# Patient Record
Sex: FEMALE | Race: WHITE | Employment: UNEMPLOYED | URBAN - METROPOLITAN AREA
[De-identification: names, ages, dates, MRNs, and addresses within clinical notes are randomized per-mention and may not be internally consistent; named-entity substitution may affect disease eponyms.]

---

## 2017-04-27 ENCOUNTER — TELEPHONE (OUTPATIENT)
Dept: INTERNAL MEDICINE CLINIC | Facility: CLINIC | Age: 62
End: 2017-04-27

## 2017-04-27 NOTE — TELEPHONE ENCOUNTER
Spoke to pt. Advised her of below recommendation for her . Pt states she has this number. Also informed pt that she has not been seen since 2015 and recommended she call back to schedule a physical.  Pt voiced understanding.

## 2017-04-27 NOTE — TELEPHONE ENCOUNTER
Pt  needs hernia surgery, would like a few recommendations out of Sasha Brice, didn't like the doctor that her 's pcp recommended. Advised that Dr usually does not make recommendations for non-pts, wanted to ask anyway.  Thank you

## 2017-08-02 ENCOUNTER — TELEPHONE (OUTPATIENT)
Dept: INTERNAL MEDICINE CLINIC | Facility: CLINIC | Age: 62
End: 2017-08-02

## 2017-08-02 NOTE — TELEPHONE ENCOUNTER
Incoming (mail or fax): Fax  Received from:  Dr. Jovani Tran  Documentation given to:  [facility] medical records bin.

## 2017-08-03 NOTE — TELEPHONE ENCOUNTER
Medical records request rec'd from pt for Dr Anselmo Alejo for entire chart. Chart request to Recall 8-3-17.

## 2017-08-11 PROCEDURE — 87624 HPV HI-RISK TYP POOLED RSLT: CPT | Performed by: INTERNAL MEDICINE

## 2017-08-11 PROCEDURE — 88175 CYTOPATH C/V AUTO FLUID REDO: CPT | Performed by: INTERNAL MEDICINE

## 2017-11-28 ENCOUNTER — HOSPITAL ENCOUNTER (OUTPATIENT)
Dept: MAMMOGRAPHY | Age: 62
Discharge: HOME | End: 2017-11-28
Attending: INTERNAL MEDICINE
Payer: COMMERCIAL

## 2017-11-28 DIAGNOSIS — Z12.31 ENCOUNTER FOR SCREENING MAMMOGRAM FOR MALIGNANT NEOPLASM OF BREAST: ICD-10-CM

## 2017-11-28 PROCEDURE — 77067 SCR MAMMO BI INCL CAD: CPT | Performed by: INTERNAL MEDICINE

## 2017-11-28 PROCEDURE — 77063 BREAST TOMOSYNTHESIS BI: CPT | Performed by: INTERNAL MEDICINE

## 2017-12-04 ENCOUNTER — HOSPITAL ENCOUNTER (OUTPATIENT)
Age: 62
Discharge: HOME | End: 2017-12-04

## 2017-12-04 PROCEDURE — 90471 IMMUNIZATION ADMIN: CPT

## 2017-12-04 PROCEDURE — 90686 IIV4 VACC NO PRSV 0.5 ML IM: CPT

## 2018-10-23 ENCOUNTER — LAB ENCOUNTER (OUTPATIENT)
Dept: LAB | Age: 63
End: 2018-10-23
Attending: INTERNAL MEDICINE
Payer: COMMERCIAL

## 2018-10-23 ENCOUNTER — OFFICE VISIT (OUTPATIENT)
Dept: INTERNAL MEDICINE CLINIC | Facility: CLINIC | Age: 63
End: 2018-10-23
Payer: COMMERCIAL

## 2018-10-23 VITALS
OXYGEN SATURATION: 96 % | WEIGHT: 158.5 LBS | RESPIRATION RATE: 14 BRPM | SYSTOLIC BLOOD PRESSURE: 120 MMHG | HEART RATE: 66 BPM | BODY MASS INDEX: 26.41 KG/M2 | DIASTOLIC BLOOD PRESSURE: 74 MMHG | HEIGHT: 65 IN | TEMPERATURE: 98 F

## 2018-10-23 DIAGNOSIS — E04.1 THYROID NODULE: ICD-10-CM

## 2018-10-23 DIAGNOSIS — Z00.00 LABORATORY EXAM ORDERED AS PART OF ROUTINE GENERAL MEDICAL EXAMINATION: ICD-10-CM

## 2018-10-23 DIAGNOSIS — R19.00 PALPABLE ABDOMINAL MASS: ICD-10-CM

## 2018-10-23 DIAGNOSIS — Z12.39 SCREENING FOR BREAST CANCER: ICD-10-CM

## 2018-10-23 DIAGNOSIS — Z00.00 WELLNESS EXAMINATION: Primary | ICD-10-CM

## 2018-10-23 PROCEDURE — 80050 GENERAL HEALTH PANEL: CPT | Performed by: INTERNAL MEDICINE

## 2018-10-23 PROCEDURE — 84439 ASSAY OF FREE THYROXINE: CPT | Performed by: INTERNAL MEDICINE

## 2018-10-23 PROCEDURE — 80061 LIPID PANEL: CPT | Performed by: INTERNAL MEDICINE

## 2018-10-23 PROCEDURE — 36415 COLL VENOUS BLD VENIPUNCTURE: CPT | Performed by: INTERNAL MEDICINE

## 2018-10-23 PROCEDURE — 99396 PREV VISIT EST AGE 40-64: CPT | Performed by: INTERNAL MEDICINE

## 2018-10-23 RX ORDER — PHYTONADIONE (VIT K1) 100 MCG
TABLET ORAL DAILY
COMMUNITY
End: 2019-01-23

## 2018-10-23 NOTE — PROGRESS NOTES
[address] [facility] Internal Medicine Office Note  Chief Complaint:   Patient presents with:  Establish Care: lump on lower abdomen       HPI:   This is a 63year old female coming in for establishing care and physical  HPI   \"lump in abdomen\" for past HIVES    Current Outpatient Medications:  Vitamin K, Phytonadione, 100 MCG Oral Tab Take by mouth daily. Disp:  Rfl:    NON FORMULARY Mushroom Disp:  Rfl:    CALCIUM OR Take by mouth.  Disp:  Rfl:    Omega-3 Fatty Acids (FISH OIL OR) Take by m 3x5cm mass palpated a few cm below umbilicus at midline and mobile. nontender. no overlying erythema). There is no tenderness. Lymphadenopathy:     She has no cervical adenopathy. Neurological: She is alert. Skin: No rash noted.    Psychiatric: She ha Physical due on 08/11/2018  Influenza Vaccine(1) due on 09/01/2018  Patient/Caregiver Education: Patient/Caregiver Education: There are no barriers to learning. Medical education done. Outcome: Patient verbalizes understanding.  Patient is notified to call

## 2018-10-24 ENCOUNTER — HOSPITAL ENCOUNTER (OUTPATIENT)
Dept: ULTRASOUND IMAGING | Age: 63
Discharge: HOME | End: 2018-10-24
Attending: INTERNAL MEDICINE
Payer: COMMERCIAL

## 2018-10-24 DIAGNOSIS — R19.00 PALPABLE ABDOMINAL MASS: ICD-10-CM

## 2018-10-24 PROCEDURE — 76705 ECHO EXAM OF ABDOMEN: CPT | Performed by: INTERNAL MEDICINE

## 2018-10-25 ENCOUNTER — TELEPHONE (OUTPATIENT)
Dept: INTERNAL MEDICINE CLINIC | Facility: CLINIC | Age: 63
End: 2018-10-25

## 2018-10-25 ENCOUNTER — APPOINTMENT (OUTPATIENT)
Dept: LAB | Facility: HOSPITAL | Age: 63
End: 2018-10-25
Attending: INTERNAL MEDICINE
Payer: COMMERCIAL

## 2018-10-25 ENCOUNTER — HOSPITAL ENCOUNTER (OUTPATIENT)
Age: 63
End: 2018-10-25
Payer: COMMERCIAL

## 2018-10-25 DIAGNOSIS — N83.8 OVARIAN MASS: Primary | ICD-10-CM

## 2018-10-25 DIAGNOSIS — R19.00 PELVIC MASS: ICD-10-CM

## 2018-10-25 PROCEDURE — 86304 IMMUNOASSAY TUMOR CA 125: CPT

## 2018-10-25 PROCEDURE — 36415 COLL VENOUS BLD VENIPUNCTURE: CPT

## 2018-10-25 NOTE — ED NOTES
Patient left without having Flu shot administered. Answered yes to one of screening questions. Patient will follow up with PCP.

## 2018-10-25 NOTE — TELEPHONE ENCOUNTER
Informed pt that I spoke to [facility] @ the [facility] and an appt was scheduled for 10/29/2018 9:45 am with Dr Oscar Price. Pt should go to the [facility] on the 12th floor and [facility].  Advised pt to bring in insurance card as sh

## 2018-10-25 NOTE — TELEPHONE ENCOUNTER
Discussed results of ultrasound with patient. Large pelvic mass. I made her an appt with Dr. Pro Vogt on 10/29/18 at 10am and gave her the contact information.  She is also interested in following up at [facility] and we initiated this process and scheduled

## 2018-11-30 ENCOUNTER — TELEPHONE (OUTPATIENT)
Dept: INTERNAL MEDICINE CLINIC | Facility: CLINIC | Age: 63
End: 2018-11-30

## 2018-11-30 DIAGNOSIS — M65.311 TRIGGER FINGER OF RIGHT THUMB: Primary | ICD-10-CM

## 2018-11-30 DIAGNOSIS — M65.319 TRIGGER FINGER OF THUMB, UNSPECIFIED LATERALITY: ICD-10-CM

## 2018-11-30 NOTE — TELEPHONE ENCOUNTER
Call placed to inform patient. Dr. Ryan Alexandre Dr, [facility] [address] [facility], [address] [place] [address]  Phone: [phone]  Fax: [phone]    Copy of placard form made and placed in teal accordion file in [address] pod.     Copy placed into scan as well

## 2018-11-30 NOTE — TELEPHONE ENCOUNTER
Pt stated that she has a trigger thumb. She would like to see a hand specialist if possible and get a cortisone injection. Pt stated that on Dec 18th she is having a major surgery at [facility].  Advised pt she would need to check with surgeon to see if it is ok

## 2018-11-30 NOTE — TELEPHONE ENCOUNTER
Referral for Dr. Robin Ramos entered -> call patient with contact information.      Parking placard form filled out and in my outbox - inform patient she will need to fill out the top portion and add her drivers license number, etc. Form is in my outbox

## 2018-12-09 ENCOUNTER — HOSPITAL ENCOUNTER (OUTPATIENT)
Age: 63
Discharge: HOME | End: 2018-12-09
Attending: FAMILY MEDICINE
Payer: COMMERCIAL

## 2018-12-09 VITALS
HEART RATE: 85 BPM | OXYGEN SATURATION: 98 % | DIASTOLIC BLOOD PRESSURE: 71 MMHG | TEMPERATURE: 99 F | BODY MASS INDEX: 24 KG/M2 | SYSTOLIC BLOOD PRESSURE: 130 MMHG | RESPIRATION RATE: 16 BRPM | WEIGHT: 142 LBS

## 2018-12-09 DIAGNOSIS — N30.00 ACUTE CYSTITIS WITHOUT HEMATURIA: Primary | ICD-10-CM

## 2018-12-09 PROCEDURE — 87088 URINE BACTERIA CULTURE: CPT | Performed by: FAMILY MEDICINE

## 2018-12-09 PROCEDURE — 87186 SC STD MICRODIL/AGAR DIL: CPT | Performed by: FAMILY MEDICINE

## 2018-12-09 PROCEDURE — 87086 URINE CULTURE/COLONY COUNT: CPT | Performed by: FAMILY MEDICINE

## 2018-12-09 PROCEDURE — 99214 OFFICE O/P EST MOD 30 MIN: CPT

## 2018-12-09 PROCEDURE — 81002 URINALYSIS NONAUTO W/O SCOPE: CPT | Performed by: FAMILY MEDICINE

## 2018-12-09 RX ORDER — NITROFURANTOIN 25; 75 MG/1; MG/1
100 CAPSULE ORAL 2 TIMES DAILY
Qty: 14 CAPSULE | Refills: 0 | Status: SHIPPED | OUTPATIENT
Start: 2018-12-09 | End: 2018-12-16

## 2018-12-09 RX ORDER — PHENAZOPYRIDINE HYDROCHLORIDE 200 MG/1
200 TABLET, FILM COATED ORAL 3 TIMES DAILY PRN
Qty: 6 TABLET | Refills: 0 | Status: SHIPPED | OUTPATIENT
Start: 2018-12-09 | End: 2018-12-11

## 2018-12-09 NOTE — ED PROVIDER NOTES
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 Constitutional and vital signs reviewed.       All other systems reviewed and negative except to ED for any worsening abdominal pain, hematuria, difficulty urination, acute urinary retention, worsening flank pain, nausea, vomiting    All results reviewed and discussed with patient.   See AVS for detailed discharge instructions for your condition tod

## 2018-12-09 NOTE — ED INITIAL ASSESSMENT (HPI)
Pt sts for the past 2-3 days c/o urinary urgency, burning, frequency, lower abd pain. Denies fever, nausea, back pain.

## 2018-12-31 ENCOUNTER — TELEPHONE (OUTPATIENT)
Dept: INTERNAL MEDICINE CLINIC | Facility: CLINIC | Age: 63
End: 2018-12-31

## 2018-12-31 NOTE — TELEPHONE ENCOUNTER
Pt stated that she had an 8-9 hour surgery at [facility] and they removed her spleen. Pt states that she was instructed to follow up with Dr. Yumiko Dan this week. No available appointments this week.  Pt also stated that they would like for her to have 3 vacc

## 2019-01-02 ENCOUNTER — LAB ENCOUNTER (OUTPATIENT)
Dept: LAB | Age: 64
End: 2019-01-02
Attending: INTERNAL MEDICINE
Payer: COMMERCIAL

## 2019-01-02 ENCOUNTER — OFFICE VISIT (OUTPATIENT)
Dept: INTERNAL MEDICINE CLINIC | Facility: CLINIC | Age: 64
End: 2019-01-02
Payer: COMMERCIAL

## 2019-01-02 VITALS
RESPIRATION RATE: 13 BRPM | HEIGHT: 65 IN | HEART RATE: 80 BPM | BODY MASS INDEX: 25.33 KG/M2 | TEMPERATURE: 98 F | SYSTOLIC BLOOD PRESSURE: 116 MMHG | WEIGHT: 152 LBS | OXYGEN SATURATION: 98 % | DIASTOLIC BLOOD PRESSURE: 64 MMHG

## 2019-01-02 DIAGNOSIS — D64.9 ANEMIA, UNSPECIFIED TYPE: ICD-10-CM

## 2019-01-02 DIAGNOSIS — Z23 NEED FOR VACCINATION AGAINST STREPTOCOCCUS PNEUMONIAE USING PNEUMOCOCCAL CONJUGATE VACCINE 13: ICD-10-CM

## 2019-01-02 DIAGNOSIS — Z86.79 HISTORY OF ATRIAL FIBRILLATION: ICD-10-CM

## 2019-01-02 DIAGNOSIS — D64.9 ANEMIA, UNSPECIFIED TYPE: Primary | ICD-10-CM

## 2019-01-02 DIAGNOSIS — Z90.81 HISTORY OF SPLENECTOMY: ICD-10-CM

## 2019-01-02 DIAGNOSIS — Z23 NEED FOR PROPHYLACTIC VACCINATION AGAINST HAEMOPHILUS INFLUENZAE TYPE B: ICD-10-CM

## 2019-01-02 LAB
BASOPHILS # BLD AUTO: 0.03 X10(3) UL (ref 0–0.1)
BASOPHILS NFR BLD AUTO: 0.6 %
EOSINOPHIL # BLD AUTO: 0.04 X10(3) UL (ref 0–0.3)
EOSINOPHIL NFR BLD AUTO: 0.8 %
ERYTHROCYTE [DISTWIDTH] IN BLOOD BY AUTOMATED COUNT: 16.2 % (ref 11.5–16)
HCT VFR BLD AUTO: 30.5 % (ref 34–50)
HGB BLD-MCNC: 9.7 G/DL (ref 12–16)
IMMATURE GRANULOCYTE COUNT: 0.11 X10(3) UL (ref 0–1)
IMMATURE GRANULOCYTE RATIO %: 2.1 %
LARGE PLATELETS: PRESENT
LYMPHOCYTES # BLD AUTO: 0.67 X10(3) UL (ref 0.9–4)
LYMPHOCYTES NFR BLD AUTO: 12.8 %
MCH RBC QN AUTO: 32.1 PG (ref 27–33.2)
MCHC RBC AUTO-ENTMCNC: 31.8 G/DL (ref 31–37)
MCV RBC AUTO: 101 FL (ref 81–100)
MONOCYTES # BLD AUTO: 1.14 X10(3) UL (ref 0.1–1)
MONOCYTES NFR BLD AUTO: 21.8 %
NEUTROPHIL ABS PRELIM: 3.23 X10 (3) UL (ref 1.3–6.7)
NEUTROPHILS # BLD AUTO: 3.23 X10(3) UL (ref 1.3–6.7)
NEUTROPHILS NFR BLD AUTO: 61.9 %
PLATELET # BLD AUTO: 851 10(3)UL (ref 150–450)
RBC # BLD AUTO: 3.02 X10(6)UL (ref 3.8–5.1)
RED CELL DISTRIBUTION WIDTH-SD: 55.6 FL (ref 35.1–46.3)
WBC # BLD AUTO: 5.2 X10(3) UL (ref 4–13)

## 2019-01-02 PROCEDURE — 90472 IMMUNIZATION ADMIN EACH ADD: CPT | Performed by: INTERNAL MEDICINE

## 2019-01-02 PROCEDURE — 36415 COLL VENOUS BLD VENIPUNCTURE: CPT | Performed by: INTERNAL MEDICINE

## 2019-01-02 PROCEDURE — 90647 HIB PRP-OMP VACC 3 DOSE IM: CPT | Performed by: INTERNAL MEDICINE

## 2019-01-02 PROCEDURE — 90734 MENACWYD/MENACWYCRM VACC IM: CPT | Performed by: INTERNAL MEDICINE

## 2019-01-02 PROCEDURE — 99214 OFFICE O/P EST MOD 30 MIN: CPT | Performed by: INTERNAL MEDICINE

## 2019-01-02 PROCEDURE — 85025 COMPLETE CBC W/AUTO DIFF WBC: CPT | Performed by: INTERNAL MEDICINE

## 2019-01-02 PROCEDURE — 90471 IMMUNIZATION ADMIN: CPT | Performed by: INTERNAL MEDICINE

## 2019-01-02 PROCEDURE — 90670 PCV13 VACCINE IM: CPT | Performed by: INTERNAL MEDICINE

## 2019-01-02 RX ORDER — DILTIAZEM HYDROCHLORIDE 180 MG/1
180 CAPSULE, EXTENDED RELEASE ORAL
Status: ON HOLD | COMMUNITY
Start: 2018-12-27 | End: 2019-01-09

## 2019-01-02 RX ORDER — ENOXAPARIN SODIUM 100 MG/ML
40 INJECTION SUBCUTANEOUS
COMMUNITY
Start: 2018-12-27 | End: 2019-01-17 | Stop reason: ALTCHOICE

## 2019-01-02 NOTE — PROGRESS NOTES
[facility] Internal Medicine Office Note  Chief Complaint:   Patient presents with:   Follow - Up: [facility]      HPI:   This is a 66year old female coming in for follow up after surgery   HPI    She had abdominal surgery including splenectomy Medications:  Enoxaparin Sodium 40 MG/0.4ML Subcutaneous Solution Inject 40 mg into the skin. Disp:  Rfl:    DilTIAZem HCl  MG Oral Capsule SR 24 Hr Take 180 mg by mouth. Disp:  Rfl:    RaNITidine HCl (ZANTAC 75 OR) Take by mouth.  Disp:  Rfl:    CALC proximal 1cm portion. No drainage, no tenderness to palpation   Neurological: She is alert. Psychiatric: She has a normal mood and affect.        ASSESSMENT AND PLAN:   Mckenna Farias is a 66year old female with  Anemia, unspecified type  (primary en INTERNAL    Mammogram due on 11/28/2018  Patient/Caregiver Education: Patient/Caregiver Education: There are no barriers to learning. Medical education done. Outcome: Patient verbalizes understanding.  Patient is notified to call with any questions, complic

## 2019-01-04 DIAGNOSIS — D64.9 ANEMIA, UNSPECIFIED TYPE: Primary | ICD-10-CM

## 2019-01-08 ENCOUNTER — APPOINTMENT (OUTPATIENT)
Dept: CT IMAGING | Facility: HOSPITAL | Age: 64
DRG: 694 | End: 2019-01-08
Attending: EMERGENCY MEDICINE
Payer: COMMERCIAL

## 2019-01-08 ENCOUNTER — TELEPHONE (OUTPATIENT)
Dept: INTERNAL MEDICINE CLINIC | Facility: CLINIC | Age: 64
End: 2019-01-08

## 2019-01-08 ENCOUNTER — APPOINTMENT (OUTPATIENT)
Dept: GENERAL RADIOLOGY | Facility: HOSPITAL | Age: 64
DRG: 694 | End: 2019-01-08
Attending: EMERGENCY MEDICINE
Payer: COMMERCIAL

## 2019-01-08 ENCOUNTER — HOSPITAL ENCOUNTER (INPATIENT)
Facility: HOSPITAL | Age: 64
LOS: 1 days | Discharge: HOME | DRG: 694 | End: 2019-01-09
Attending: EMERGENCY MEDICINE | Admitting: STUDENT IN AN ORGANIZED HEALTH CARE EDUCATION/TRAINING PROGRAM
Payer: COMMERCIAL

## 2019-01-08 DIAGNOSIS — N20.0 RIGHT KIDNEY STONE: ICD-10-CM

## 2019-01-08 DIAGNOSIS — N12 PYELONEPHRITIS: Primary | ICD-10-CM

## 2019-01-08 PROBLEM — R73.9 HYPERGLYCEMIA: Status: ACTIVE | Noted: 2019-01-08

## 2019-01-08 PROBLEM — D72.829 LEUKOCYTOSIS: Status: ACTIVE | Noted: 2019-01-08

## 2019-01-08 LAB
ALBUMIN SERPL-MCNC: 2.9 G/DL (ref 3.1–4.5)
ALBUMIN/GLOB SERPL: 0.7 {RATIO} (ref 1–2)
ALP LIVER SERPL-CCNC: 139 U/L (ref 50–130)
ALT SERPL-CCNC: 23 U/L (ref 14–54)
ANION GAP SERPL CALC-SCNC: 8 MMOL/L (ref 0–18)
AST SERPL-CCNC: 25 U/L (ref 15–41)
BASOPHILS # BLD AUTO: 0.03 X10(3) UL (ref 0–0.1)
BASOPHILS NFR BLD AUTO: 0.1 %
BILIRUB SERPL-MCNC: 0.1 MG/DL (ref 0.1–2)
BILIRUB UR QL STRIP.AUTO: NEGATIVE
BUN BLD-MCNC: 8 MG/DL (ref 8–20)
BUN/CREAT SERPL: 12.5 (ref 10–20)
CALCIUM BLD-MCNC: 8.9 MG/DL (ref 8.3–10.3)
CHLORIDE SERPL-SCNC: 108 MMOL/L (ref 101–111)
CO2 SERPL-SCNC: 23 MMOL/L (ref 22–32)
COLOR UR AUTO: YELLOW
CREAT BLD-MCNC: 0.64 MG/DL (ref 0.55–1.02)
EOSINOPHIL # BLD AUTO: 0 X10(3) UL (ref 0–0.3)
EOSINOPHIL NFR BLD AUTO: 0 %
ERYTHROCYTE [DISTWIDTH] IN BLOOD BY AUTOMATED COUNT: 17.9 % (ref 11.5–16)
GLOBULIN PLAS-MCNC: 4.3 G/DL (ref 2.8–4.4)
GLUCOSE BLD-MCNC: 108 MG/DL (ref 70–99)
GLUCOSE UR STRIP.AUTO-MCNC: NEGATIVE MG/DL
HCT VFR BLD AUTO: 30.7 % (ref 34–50)
HGB BLD-MCNC: 10.4 G/DL (ref 12–16)
IMMATURE GRANULOCYTE COUNT: 0.22 X10(3) UL (ref 0–1)
IMMATURE GRANULOCYTE RATIO %: 1 %
KETONES UR STRIP.AUTO-MCNC: 80 MG/DL
LYMPHOCYTES # BLD AUTO: 0.55 X10(3) UL (ref 0.9–4)
LYMPHOCYTES NFR BLD AUTO: 2.5 %
M PROTEIN MFR SERPL ELPH: 7.2 G/DL (ref 6.4–8.2)
MCH RBC QN AUTO: 33.8 PG (ref 27–33.2)
MCHC RBC AUTO-ENTMCNC: 33.9 G/DL (ref 31–37)
MCV RBC AUTO: 99.7 FL (ref 81–100)
MONOCYTES # BLD AUTO: 0.88 X10(3) UL (ref 0.1–1)
MONOCYTES NFR BLD AUTO: 4 %
NEUTROPHIL ABS PRELIM: 20.27 X10 (3) UL (ref 1.3–6.7)
NEUTROPHILS # BLD AUTO: 20.27 X10(3) UL (ref 1.3–6.7)
NEUTROPHILS NFR BLD AUTO: 92.4 %
NITRITE UR QL STRIP.AUTO: NEGATIVE
OSMOLALITY SERPL CALC.SUM OF ELEC: 287 MOSM/KG (ref 275–295)
PH UR STRIP.AUTO: 5 [PH] (ref 4.5–8)
PLATELET # BLD AUTO: 1550 10(3)UL (ref 150–450)
POTASSIUM SERPL-SCNC: 4.5 MMOL/L (ref 3.6–5.1)
PROT UR STRIP.AUTO-MCNC: 30 MG/DL
RBC # BLD AUTO: 3.08 X10(6)UL (ref 3.8–5.1)
RBC #/AREA URNS AUTO: >10 /HPF
RBC UR QL AUTO: NEGATIVE
RED CELL DISTRIBUTION WIDTH-SD: 59.1 FL (ref 35.1–46.3)
SODIUM SERPL-SCNC: 139 MMOL/L (ref 136–144)
SP GR UR STRIP.AUTO: 1.02 (ref 1–1.03)
UROBILINOGEN UR STRIP.AUTO-MCNC: <2 MG/DL
WBC # BLD AUTO: 22 X10(3) UL (ref 4–13)

## 2019-01-08 PROCEDURE — 99222 1ST HOSP IP/OBS MODERATE 55: CPT | Performed by: STUDENT IN AN ORGANIZED HEALTH CARE EDUCATION/TRAINING PROGRAM

## 2019-01-08 PROCEDURE — 74177 CT ABD & PELVIS W/CONTRAST: CPT | Performed by: EMERGENCY MEDICINE

## 2019-01-08 PROCEDURE — 71045 X-RAY EXAM CHEST 1 VIEW: CPT | Performed by: EMERGENCY MEDICINE

## 2019-01-08 RX ORDER — ONDANSETRON 2 MG/ML
4 INJECTION INTRAMUSCULAR; INTRAVENOUS EVERY 4 HOURS PRN
Status: DISCONTINUED | OUTPATIENT
Start: 2019-01-08 | End: 2019-01-09

## 2019-01-08 RX ORDER — HYDROMORPHONE HYDROCHLORIDE 1 MG/ML
0.5 INJECTION, SOLUTION INTRAMUSCULAR; INTRAVENOUS; SUBCUTANEOUS EVERY 30 MIN PRN
Status: ACTIVE | OUTPATIENT
Start: 2019-01-08 | End: 2019-01-09

## 2019-01-08 RX ORDER — ONDANSETRON 2 MG/ML
4 INJECTION INTRAMUSCULAR; INTRAVENOUS ONCE
Status: COMPLETED | OUTPATIENT
Start: 2019-01-08 | End: 2019-01-08

## 2019-01-08 RX ORDER — METOCLOPRAMIDE HYDROCHLORIDE 5 MG/ML
10 INJECTION INTRAMUSCULAR; INTRAVENOUS EVERY 8 HOURS PRN
Status: DISCONTINUED | OUTPATIENT
Start: 2019-01-08 | End: 2019-01-09

## 2019-01-08 RX ORDER — BISACODYL 10 MG
10 SUPPOSITORY, RECTAL RECTAL
Status: DISCONTINUED | OUTPATIENT
Start: 2019-01-08 | End: 2019-01-09

## 2019-01-08 RX ORDER — FAMOTIDINE 20 MG/1
10 TABLET ORAL NIGHTLY
Status: DISCONTINUED | OUTPATIENT
Start: 2019-01-08 | End: 2019-01-09

## 2019-01-08 RX ORDER — HYDROCODONE BITARTRATE AND ACETAMINOPHEN 5; 325 MG/1; MG/1
1 TABLET ORAL EVERY 6 HOURS PRN
Status: DISCONTINUED | OUTPATIENT
Start: 2019-01-08 | End: 2019-01-09

## 2019-01-08 RX ORDER — KETOROLAC TROMETHAMINE 30 MG/ML
15 INJECTION, SOLUTION INTRAMUSCULAR; INTRAVENOUS EVERY 6 HOURS PRN
Status: DISCONTINUED | OUTPATIENT
Start: 2019-01-08 | End: 2019-01-09

## 2019-01-08 RX ORDER — POLYETHYLENE GLYCOL 3350 17 G/17G
17 POWDER, FOR SOLUTION ORAL DAILY PRN
Status: DISCONTINUED | OUTPATIENT
Start: 2019-01-08 | End: 2019-01-09

## 2019-01-08 RX ORDER — HYDROMORPHONE HYDROCHLORIDE 1 MG/ML
1 INJECTION, SOLUTION INTRAMUSCULAR; INTRAVENOUS; SUBCUTANEOUS EVERY 30 MIN PRN
Status: DISCONTINUED | OUTPATIENT
Start: 2019-01-08 | End: 2019-01-09

## 2019-01-08 RX ORDER — CHOLECALCIFEROL (VITAMIN D3) 125 MCG
CAPSULE ORAL NIGHTLY PRN
COMMUNITY
End: 2019-01-23 | Stop reason: ALTCHOICE

## 2019-01-08 RX ORDER — SODIUM CHLORIDE 9 MG/ML
INJECTION, SOLUTION INTRAVENOUS CONTINUOUS
Status: DISCONTINUED | OUTPATIENT
Start: 2019-01-09 | End: 2019-01-09

## 2019-01-08 RX ORDER — ONDANSETRON 2 MG/ML
4 INJECTION INTRAMUSCULAR; INTRAVENOUS EVERY 6 HOURS PRN
Status: DISCONTINUED | OUTPATIENT
Start: 2019-01-08 | End: 2019-01-09

## 2019-01-08 RX ORDER — KETOROLAC TROMETHAMINE 30 MG/ML
30 INJECTION, SOLUTION INTRAMUSCULAR; INTRAVENOUS EVERY 6 HOURS PRN
Status: DISCONTINUED | OUTPATIENT
Start: 2019-01-08 | End: 2019-01-09

## 2019-01-08 RX ORDER — ENOXAPARIN SODIUM 100 MG/ML
40 INJECTION SUBCUTANEOUS NIGHTLY
Status: DISCONTINUED | OUTPATIENT
Start: 2019-01-08 | End: 2019-01-09

## 2019-01-08 RX ORDER — ACETAMINOPHEN 325 MG/1
650 TABLET ORAL EVERY 6 HOURS PRN
Status: DISCONTINUED | OUTPATIENT
Start: 2019-01-08 | End: 2019-01-09

## 2019-01-08 RX ORDER — SODIUM CHLORIDE 9 MG/ML
INJECTION, SOLUTION INTRAVENOUS CONTINUOUS
Status: ACTIVE | OUTPATIENT
Start: 2019-01-08 | End: 2019-01-09

## 2019-01-08 RX ORDER — SODIUM PHOSPHATE, DIBASIC AND SODIUM PHOSPHATE, MONOBASIC 7; 19 G/133ML; G/133ML
1 ENEMA RECTAL ONCE AS NEEDED
Status: DISCONTINUED | OUTPATIENT
Start: 2019-01-08 | End: 2019-01-09

## 2019-01-08 NOTE — TELEPHONE ENCOUNTER
Patients  called to let Dr Arnulfo Drummond know that he was taking Marin Gouty to [facility]

## 2019-01-09 ENCOUNTER — APPOINTMENT (OUTPATIENT)
Dept: ULTRASOUND IMAGING | Facility: HOSPITAL | Age: 64
DRG: 694 | End: 2019-01-09
Attending: UROLOGY
Payer: COMMERCIAL

## 2019-01-09 VITALS
HEART RATE: 89 BPM | TEMPERATURE: 99 F | RESPIRATION RATE: 20 BRPM | DIASTOLIC BLOOD PRESSURE: 56 MMHG | SYSTOLIC BLOOD PRESSURE: 122 MMHG | WEIGHT: 145.31 LBS | HEIGHT: 66 IN | OXYGEN SATURATION: 92 % | BODY MASS INDEX: 23.35 KG/M2

## 2019-01-09 LAB
ANION GAP SERPL CALC-SCNC: 7 MMOL/L (ref 0–18)
BASOPHILS # BLD AUTO: 0.03 X10(3) UL (ref 0–0.1)
BASOPHILS NFR BLD AUTO: 0.2 %
BUN BLD-MCNC: 7 MG/DL (ref 8–20)
BUN/CREAT SERPL: 13.5 (ref 10–20)
CALCIUM BLD-MCNC: 8.1 MG/DL (ref 8.3–10.3)
CHLORIDE SERPL-SCNC: 109 MMOL/L (ref 101–111)
CO2 SERPL-SCNC: 22 MMOL/L (ref 22–32)
CREAT BLD-MCNC: 0.52 MG/DL (ref 0.55–1.02)
EOSINOPHIL # BLD AUTO: 0 X10(3) UL (ref 0–0.3)
EOSINOPHIL NFR BLD AUTO: 0 %
ERYTHROCYTE [DISTWIDTH] IN BLOOD BY AUTOMATED COUNT: 17.7 % (ref 11.5–16)
GLUCOSE BLD-MCNC: 96 MG/DL (ref 65–99)
GLUCOSE BLD-MCNC: 97 MG/DL (ref 70–99)
HCT VFR BLD AUTO: 25.7 % (ref 34–50)
HGB BLD-MCNC: 8.6 G/DL (ref 12–16)
IMMATURE GRANULOCYTE COUNT: 0.11 X10(3) UL (ref 0–1)
IMMATURE GRANULOCYTE RATIO %: 0.7 %
LYMPHOCYTES # BLD AUTO: 0.87 X10(3) UL (ref 0.9–4)
LYMPHOCYTES NFR BLD AUTO: 5.3 %
MCH RBC QN AUTO: 33.2 PG (ref 27–33.2)
MCHC RBC AUTO-ENTMCNC: 33.5 G/DL (ref 31–37)
MCV RBC AUTO: 99.2 FL (ref 81–100)
MONOCYTES # BLD AUTO: 1.31 X10(3) UL (ref 0.1–1)
MONOCYTES NFR BLD AUTO: 8 %
NEUTROPHIL ABS PRELIM: 14.03 X10 (3) UL (ref 1.3–6.7)
NEUTROPHILS # BLD AUTO: 14.03 X10(3) UL (ref 1.3–6.7)
NEUTROPHILS NFR BLD AUTO: 85.8 %
OSMOLALITY SERPL CALC.SUM OF ELEC: 284 MOSM/KG (ref 275–295)
PLATELET # BLD AUTO: 1269 10(3)UL (ref 150–450)
POTASSIUM SERPL-SCNC: 4 MMOL/L (ref 3.6–5.1)
RBC # BLD AUTO: 2.59 X10(6)UL (ref 3.8–5.1)
RED CELL DISTRIBUTION WIDTH-SD: 58.1 FL (ref 35.1–46.3)
SODIUM SERPL-SCNC: 138 MMOL/L (ref 136–144)
WBC # BLD AUTO: 16.4 X10(3) UL (ref 4–13)

## 2019-01-09 PROCEDURE — 76775 US EXAM ABDO BACK WALL LIM: CPT | Performed by: UROLOGY

## 2019-01-09 PROCEDURE — 99239 HOSP IP/OBS DSCHRG MGMT >30: CPT | Performed by: HOSPITALIST

## 2019-01-09 RX ORDER — CEPHALEXIN 500 MG/1
500 TABLET ORAL 3 TIMES DAILY
Qty: 30 TABLET | Refills: 0 | Status: SHIPPED | OUTPATIENT
Start: 2019-01-09 | End: 2019-01-17 | Stop reason: ALTCHOICE

## 2019-01-09 RX ORDER — DILTIAZEM HYDROCHLORIDE 180 MG/1
180 CAPSULE, EXTENDED RELEASE ORAL DAILY
Qty: 30 CAPSULE | Refills: 1 | Status: SHIPPED | OUTPATIENT
Start: 2019-01-09 | End: 2019-02-12 | Stop reason: ALTCHOICE

## 2019-01-09 RX ORDER — FAMOTIDINE 10 MG/ML
20 INJECTION, SOLUTION INTRAVENOUS DAILY
Status: DISCONTINUED | OUTPATIENT
Start: 2019-01-09 | End: 2019-01-09

## 2019-01-09 NOTE — PLAN OF CARE
Resting in bed,awake & alerrt,denying any pain nor any difficulty voiding. Stable vital signs. Awaitig further instructions from Dr Kuldip Cesar. UPdated patient with plan of care. Tolerating IVF. Encouraged to increase activity & ambulate.

## 2019-01-09 NOTE — ED NOTES
Pt assisted to the bedpan, voided 650 cc clear yellow urine. Pt reports pain gone after voiding. Strained urine, noted a small amount of gritty substance on the strainer. Pt kept clean and dry.  She is requesting nausea med, will update Dr Chau Nelson

## 2019-01-09 NOTE — CONSULTS
[facility]  Report of Consultation    García Melosherwin Patient Status:  Inpatient    1955 MRN [id number]   [facility] 4NW-A Attending Wilfred Fulton MD   Hosp Day # 0 PCP Nico Jeffries MD     Impression and Plan:  Transient r complaints. The urinalysis showed only rare bacteria. Urine culture pending. Rocephin was given in the emergency room. I suggested she might need to consider right ureteral stent placement.   Tentatively could be done tomorrow, but we decided to check a suppository 10 mg 10 mg Rectal Daily PRN   FLEET ENEMA (FLEET) 7-19 GM/118ML enema 133 mL 1 enema Rectal Once PRN   [START ON 1/9/2019] CefTRIAXone Sodium (ROCEPHIN) 1 g in sodium chloride 0.9 % 100 mL MBP/ADD-vantage 1 g Intravenous Q24H   HYDROcodone-ace children: Not on file      Years of education: Not on file      Highest education level: Not on file    Social Needs      Financial resource strain: Not on file      Food insecurity - worry: Not on file      Food insecurity - inability: Not on file      Tr within normal limits. Spleen is not palpable. Genitourinary: No flank tenderness. Extremities:  No lower extremity edema noted. Without clubbing or cyanosis. Skin: Normal texture and turgor.   Neurologic:  Motor strength and sensory examination is

## 2019-01-09 NOTE — ED NOTES
Attempts to listen to lungs, heart and bowel sounds unsuccessful. Patient did not want to be touched or moved again and states \"the doctor just listened to me. \"

## 2019-01-09 NOTE — ED PROVIDER NOTES
Patient Seen in: [facility] Emergency Department    History   Patient presents with:  Postop/Procedure Problem    Stated Complaint: flank pain, s/p kidney surgery    HPI    65-year-old female presents to the emergency department with right flank bettina arthroscopy   • FNA (INDICATE SOURCE BELOW)      thyroid nodule x1 negative   • HIPEC SPECIMEN TO PATHOLOGY             Social History    Tobacco Use      Smoking status: Never Smoker      Smokeless tobacco: Never Used    Alcohol use: No      Alcohol/week: METABOLIC PANEL (14) - Abnormal; Notable for the following components:       Result Value    Glucose 108 (*)     Alkaline Phosphatase 139 (*)     Albumin 2.9 (*)     A/G Ratio 0.7 (*)     All other components within normal limits   URINALYSIS WITH CULTURE (er)    Result Date: 1/8/2019  CONCLUSION:  1.  0.4 cm mid to distal right ureteral calculus with moderate obstruction. 2.  There is a small amount of scattered nonspecific ascites. 3.  Moderate right and mild left layering pleural effusions.   Adjacent armen (primary encounter diagnosis)  Right kidney stone    Disposition:  Admit  1/8/2019  8:04 pm    Follow-up:  No follow-up provider specified.       Medications Prescribed:  Current Discharge Medication List        Present on Admission  Date Reviewed: 1/2/2019

## 2019-01-09 NOTE — H&P
[facility]  History and Physical     Jayce Wilkes Patient Status:  Inpatient    1955 MRN [id number]   [facility] 4NW-A Attending Lucita Evangelista MD   Hosp Day # 0 PCP Noni Catalan MD     Chief Complaint: Flank pain SPECIMEN TO PATHOLOGY         Social History:  reports that  has never smoked. she has never used smokeless tobacco. She reports that she does not drink alcohol or use drugs.     Family History:   Family History   Problem Relation Age of Onset   • Diabetes Normocephalic atraumatic. Moist mucous membranes. EOM-I. PERRLA. Anicteric. Neck: No lymphadenopathy. No JVD. No carotid bruits. Respiratory: Clear to auscultation bilaterally. No wheezes. No rhonchi. Cardiovascular: S1, S2. Regular rate and rhythm.  No

## 2019-01-09 NOTE — PROGRESS NOTES
[facility]  Urology Progress Note    Dedra Wilkes Patient Status:  Inpatient    1955 MRN [id number]   [facility] 4NW-A Attending Tod Martin MD   [facility] Day # 1 PCP Pablo Damico MD     Subjective:  Reina Ward is a TAMAR  [facility] Urology  1/9/2019  9:50 AM

## 2019-01-09 NOTE — CONSULTS
Pharmacy Consult Note:  Medication List Evaluation     Dipak Wallace is a 66year old female admitted for ureteral obstruction/UTI. Pharmacy has been asked by Dr. Lisa Soto to evaluate patient's current medications for gluten and corn allergy.     Nish Baeza 54947-842-13  4. Acetaminophen 325 mg NDC 0034-9836-62  5. Hydrocodone/APAP 5/325 mg NDC 75477-583-47    Tylenol 500 mg tablet 63502-893-65 DOES NOT contain corn starch/gluten. PEG 3350 powder NDC [id number] DOES NOT contain corn starch/gluten.   Most inje

## 2019-01-09 NOTE — PROGRESS NOTES
IM quick note:    Pt seen and examined. Feels good, cleared to go home per urology.     /56 (BP Location: Right arm)   Pulse 89   Temp 99.1 °F (37.3 °C) (Oral)   Resp 20   Ht 5' 6\" (1.676 m)   Wt 145 lb 4.8 oz (65.9 kg)   SpO2 92%   BMI 23.45 kg/m²

## 2019-01-09 NOTE — ED NOTES
Assumed care pt returned from CT, states pain 5/10. Pt requesting pain meds, refused Dilaudid, \"my oxygen level drops, I'm more sensitive to it. \" Martha Ayala informed who came to pt's bedside

## 2019-01-09 NOTE — PAYOR COMM NOTE
--------------  ADMISSION REVIEW     Payor: [facility]  Subscriber #:  [id number]  Authorization Number: [id number]    Admit date: 1/8/19  Admit time: 2157       Admitting Physician: Keisha Lin MD  Attending Physician:  Pau Luther MD  Primary Care Miscarriage 1984    s/p D & C   • Multinodular goiter    • Nasal septal deviation 11/10    significant,right sided   • Pharyngitis 8/06   • Sinus mucosal thickening 11/10    bilat, of maxillary sinus floors and scant bilateral anterior ethmoidal air cell m lesions. Tympanic members are intact and clear bilaterally. No JVD or adenopathy. Lungs: Clear to auscultation bilaterally. No wheezes, rhonchi, or rales appreciated. No accessory muscle use noted for breathing. Cardiac: Regular rate and rhythm.   Nor ---------                               -----------         ------                     CBC W/ DIFFERENTIAL[[id number]]          Abnormal            Final result                 Please view results for these tests on the individual orders.    Amada Sewell atelectasis and or pneumonia. There is likely atelectasis continue the patient been splinting with breathing due to pain. Mild colonic diverticulosis.   Liver and right renal cyst.  Admission disposition: 1/8/2019  8:04 PM       The patient was given [facility] on 12/2018. Pt reports having flank pain since then , has felt weak and overall unwell. She has had diminished PO intake, nausea / vomiting and decreasing urine output.    When see in ED she reports having a large amount of urine with sediment in it and fel Sister    • Other (SLE,RA,DM) Sister        Allergies:   Dairy Products          HIVES  Corn                    HIVES  Eggs Or Egg-Derived*    HIVES  Erythromycin              Gluten Flour              Gluten Meal             OTHER (SEE COMMENTS)  Nuts sounds. No rebound, guarding or organomegaly. Neurologic: No focal neurological deficits. CNII-XII grossly intact. Musculoskeletal: Moves all extremities. Extremities: No edema or cyanosis. Integument: No rashes or lesions.    Psychiatric: Appropriate m mg Subcutaneous (Left Upper Abdomen) Vinay Martinez, RN      famoTIDine (PEPCID) injection 20 mg     Date Action Dose Route User    1/9/2019 0924 Given 20 mg Intravenous Damaso Goldsmith, CIERRA      HYDROmorphone HCl (DILAUDID) 1 MG/ML injection 1 mg

## 2019-01-10 ENCOUNTER — PATIENT MESSAGE (OUTPATIENT)
Dept: INTERNAL MEDICINE CLINIC | Facility: CLINIC | Age: 64
End: 2019-01-10

## 2019-01-10 ENCOUNTER — PATIENT OUTREACH (OUTPATIENT)
Dept: CASE MANAGEMENT | Age: 64
End: 2019-01-10

## 2019-01-10 DIAGNOSIS — N12 PYELONEPHRITIS: Primary | ICD-10-CM

## 2019-01-10 DIAGNOSIS — N20.0 RIGHT KIDNEY STONE: ICD-10-CM

## 2019-01-10 NOTE — PAYOR COMM NOTE
--------------  DISCHARGE REVIEW    Payor: CAITLYN DHILLON  Subscriber #:  [id number]  Authorization Number: [id number]    Admit date: 1/8/19  Admit time:  2157  Discharge Date: 1/9/2019  2:20 PM     Admitting Physician: Ted Singh MD  Attending Physician:

## 2019-01-11 ENCOUNTER — TELEPHONE (OUTPATIENT)
Dept: INTERNAL MEDICINE CLINIC | Facility: CLINIC | Age: 64
End: 2019-01-11

## 2019-01-11 NOTE — TELEPHONE ENCOUNTER
From: Carmelita Wilkes  To:  Tere Pierre MD  Sent: 1/10/2019 7:41 PM CST  Subject: Visit Follow-up Question    I need to see a urologist in 2weeks to follow up with the kidney stone which I had at the hospital. Do you have a good recommendation for one

## 2019-01-11 NOTE — TELEPHONE ENCOUNTER
Referral pended with Dr. Sherren Person listed. Please advise if ok. Will give contact information once provider responds.

## 2019-01-11 NOTE — DISCHARGE SUMMARY
[facility]  DISCHARGE SUMMARY     Dovie Harada Rousonelos Patient Status:  Inpatient    1955 MRN [id number]   [facility] 4NW-A Attending No att. providers found   Hosp Day # 1 PCP Salud Monzon MD     Date of Admission: 2019  D medications      Instructions Prescription details   Cephalexin 500 MG Tabs      Take 500 mg by mouth 3 (three) times daily for 10 days.    Stop taking on:  1/19/2019  Quantity:  30 tablet  Refills:  0        CHANGE how you take these medications      Instr Regular rate and rhythm. No murmurs, rubs or gallops. Abdomen: Soft, nontender, nondistended. Positive bowel sounds. No rebound or guarding. Neurologic: No focal neurological deficits. Musculoskeletal: Moves all extremities. Extremities: No edema.

## 2019-01-11 NOTE — TELEPHONE ENCOUNTER
Pt spouse called and stated that pt was given heart medication for a-fib and was doing better but is now having a-fib again he things. Pt denies chest pain, n/v, vision changes, or headache. Pt is having some shortness of breath and facing heart.      Pt ha

## 2019-01-23 PROCEDURE — 81015 MICROSCOPIC EXAM OF URINE: CPT | Performed by: PHYSICIAN ASSISTANT

## 2019-01-23 PROCEDURE — 87086 URINE CULTURE/COLONY COUNT: CPT | Performed by: PHYSICIAN ASSISTANT

## 2019-02-12 ENCOUNTER — LAB ENCOUNTER (OUTPATIENT)
Dept: LAB | Age: 64
End: 2019-02-12
Attending: INTERNAL MEDICINE
Payer: COMMERCIAL

## 2019-02-12 ENCOUNTER — OFFICE VISIT (OUTPATIENT)
Dept: INTERNAL MEDICINE CLINIC | Facility: CLINIC | Age: 64
End: 2019-02-12
Payer: COMMERCIAL

## 2019-02-12 VITALS
SYSTOLIC BLOOD PRESSURE: 114 MMHG | WEIGHT: 136.75 LBS | HEART RATE: 82 BPM | DIASTOLIC BLOOD PRESSURE: 68 MMHG | BODY MASS INDEX: 22.78 KG/M2 | OXYGEN SATURATION: 98 % | RESPIRATION RATE: 16 BRPM | TEMPERATURE: 98 F | HEIGHT: 65 IN

## 2019-02-12 DIAGNOSIS — R53.1 GENERALIZED WEAKNESS: ICD-10-CM

## 2019-02-12 DIAGNOSIS — R53.83 FATIGUE, UNSPECIFIED TYPE: ICD-10-CM

## 2019-02-12 DIAGNOSIS — R53.83 FATIGUE, UNSPECIFIED TYPE: Primary | ICD-10-CM

## 2019-02-12 DIAGNOSIS — R60.0 LOWER EXTREMITY EDEMA: ICD-10-CM

## 2019-02-12 DIAGNOSIS — D64.9 ANEMIA, UNSPECIFIED TYPE: ICD-10-CM

## 2019-02-12 DIAGNOSIS — N39.0 URINARY TRACT INFECTION WITHOUT HEMATURIA, SITE UNSPECIFIED: ICD-10-CM

## 2019-02-12 LAB
ALBUMIN SERPL-MCNC: 2.7 G/DL (ref 3.4–5)
ALBUMIN/GLOB SERPL: 0.6 {RATIO} (ref 1–2)
ALP LIVER SERPL-CCNC: 98 U/L (ref 50–130)
ALT SERPL-CCNC: 51 U/L (ref 13–56)
ANION GAP SERPL CALC-SCNC: 8 MMOL/L (ref 0–18)
APPEARANCE: CLEAR
AST SERPL-CCNC: 35 U/L (ref 15–37)
BASOPHILS # BLD AUTO: 0.06 X10(3) UL (ref 0–0.2)
BASOPHILS NFR BLD AUTO: 0.7 %
BILIRUB SERPL-MCNC: 0.2 MG/DL (ref 0.1–2)
BUN BLD-MCNC: 13 MG/DL (ref 7–18)
BUN/CREAT SERPL: 21.3 (ref 10–20)
CALCIUM BLD-MCNC: 8.6 MG/DL (ref 8.5–10.1)
CHLORIDE SERPL-SCNC: 110 MMOL/L (ref 98–107)
CO2 SERPL-SCNC: 24 MMOL/L (ref 21–32)
CREAT BLD-MCNC: 0.61 MG/DL (ref 0.55–1.02)
DEPRECATED RDW RBC AUTO: 68.6 FL (ref 35.1–46.3)
EOSINOPHIL # BLD AUTO: 0.37 X10(3) UL (ref 0–0.7)
EOSINOPHIL NFR BLD AUTO: 4.3 %
ERYTHROCYTE [DISTWIDTH] IN BLOOD BY AUTOMATED COUNT: 18.4 % (ref 11–15)
GLOBULIN PLAS-MCNC: 4.2 G/DL (ref 2.8–4.4)
GLUCOSE BLD-MCNC: 99 MG/DL (ref 70–99)
HCT VFR BLD AUTO: 38.6 % (ref 35–48)
HELMET CELLS BLD QL SMEAR: PRESENT
HGB BLD-MCNC: 12.3 G/DL (ref 12–16)
IMM GRANULOCYTES # BLD AUTO: 0.03 X10(3) UL (ref 0–1)
IMM GRANULOCYTES NFR BLD: 0.3 %
KETONES (URINE DIPSTICK): 40 MG/DL
LYMPHOCYTES # BLD AUTO: 1.07 X10(3) UL (ref 1–4)
LYMPHOCYTES NFR BLD AUTO: 12.5 %
M PROTEIN MFR SERPL ELPH: 6.9 G/DL (ref 6.4–8.2)
MCH RBC QN AUTO: 32.5 PG (ref 26–34)
MCHC RBC AUTO-ENTMCNC: 31.9 G/DL (ref 31–37)
MCV RBC AUTO: 101.8 FL (ref 80–100)
MONOCYTES # BLD AUTO: 1.03 X10(3) UL (ref 0.1–1)
MONOCYTES NFR BLD AUTO: 12 %
MULTISTIX LOT#: ABNORMAL NUMERIC
NEUTROPHILS # BLD AUTO: 6.03 X10 (3) UL (ref 1.5–7.7)
NEUTROPHILS # BLD AUTO: 6.03 X10(3) UL (ref 1.5–7.7)
NEUTROPHILS NFR BLD AUTO: 70.2 %
OSMOLALITY SERPL CALC.SUM OF ELEC: 294 MOSM/KG (ref 275–295)
PH, URINE: 5.5 (ref 4.5–8)
PLATELET # BLD AUTO: 489 10(3)UL (ref 150–450)
POTASSIUM SERPL-SCNC: 4.1 MMOL/L (ref 3.5–5.1)
RBC # BLD AUTO: 3.79 X10(6)UL (ref 3.8–5.3)
SODIUM SERPL-SCNC: 142 MMOL/L (ref 136–145)
SPECIFIC GRAVITY: 1.25 (ref 1–1.03)
URINE-COLOR: YELLOW
UROBILINOGEN,SEMI-QN: 0.2 MG/DL (ref 0–1.9)
WBC # BLD AUTO: 8.6 X10(3) UL (ref 4–11)

## 2019-02-12 PROCEDURE — 99214 OFFICE O/P EST MOD 30 MIN: CPT | Performed by: INTERNAL MEDICINE

## 2019-02-12 PROCEDURE — 80053 COMPREHEN METABOLIC PANEL: CPT | Performed by: INTERNAL MEDICINE

## 2019-02-12 PROCEDURE — 87086 URINE CULTURE/COLONY COUNT: CPT | Performed by: INTERNAL MEDICINE

## 2019-02-12 PROCEDURE — 81003 URINALYSIS AUTO W/O SCOPE: CPT | Performed by: INTERNAL MEDICINE

## 2019-02-12 PROCEDURE — 85025 COMPLETE CBC W/AUTO DIFF WBC: CPT | Performed by: INTERNAL MEDICINE

## 2019-02-12 PROCEDURE — 36415 COLL VENOUS BLD VENIPUNCTURE: CPT | Performed by: INTERNAL MEDICINE

## 2019-02-12 RX ORDER — NITROFURANTOIN 25; 75 MG/1; MG/1
100 CAPSULE ORAL 2 TIMES DAILY
Qty: 14 CAPSULE | Refills: 0 | Status: SHIPPED | OUTPATIENT
Start: 2019-02-12 | End: 2019-02-12

## 2019-02-12 RX ORDER — NITROFURANTOIN 25; 75 MG/1; MG/1
100 CAPSULE ORAL 2 TIMES DAILY
Qty: 14 CAPSULE | Refills: 0 | Status: SHIPPED | OUTPATIENT
Start: 2019-02-12 | End: 2019-08-01

## 2019-02-12 NOTE — PROGRESS NOTES
[address] [facility] Internal Medicine Office Note  Chief Complaint:   Patient presents with:  Leg Swelling      HPI:   This is a 66year old female coming in for leg swelling and not feeling well   HPI    She feels leg weakness and is using a walker which Family History   Problem Relation Age of Onset   • Diabetes Mother    • Hypertension Mother    • Dementia Mother    • Other (Other) Mother    • Heart Disorder Father    • Other (RA) Father    • Other (MDS) Sister    • Other (SLE,RA,DM) Sister         I Neurological: Positive for headaches. Psychiatric/Behavioral: Negative.          EXAM:   /68   Pulse 82   Temp 98.1 °F (36.7 °C) (Oral)   Resp 16   Ht 65\"   Wt 136 lb 12 oz   SpO2 98%   BMI 22.76 kg/m²  Estimated body mass index is 22.76 kg/m² as ROUTINE; Future  -     URINE CULTURE, ROUTINE    Urinary tract infection without hematuria, site unspecified - urine dip shows leukocytes and will treat for infection pending urine culture. -     Nitrofurantoin Monohyd Macro 100 MG Oral Cap;  Take 1 capsu

## 2019-02-20 ENCOUNTER — HOSPITAL ENCOUNTER (OUTPATIENT)
Dept: CV DIAGNOSTICS | Facility: HOSPITAL | Age: 64
Discharge: HOME | End: 2019-02-20
Attending: INTERNAL MEDICINE
Payer: COMMERCIAL

## 2019-02-20 DIAGNOSIS — Z86.79 HISTORY OF ATRIAL FIBRILLATION: ICD-10-CM

## 2019-02-20 PROCEDURE — 93306 TTE W/DOPPLER COMPLETE: CPT | Performed by: INTERNAL MEDICINE

## 2019-02-21 ENCOUNTER — OFFICE VISIT (OUTPATIENT)
Dept: NEUROLOGY | Facility: CLINIC | Age: 64
End: 2019-02-21
Payer: COMMERCIAL

## 2019-02-21 ENCOUNTER — APPOINTMENT (OUTPATIENT)
Dept: LAB | Age: 64
End: 2019-02-21
Attending: Other
Payer: COMMERCIAL

## 2019-02-21 VITALS
DIASTOLIC BLOOD PRESSURE: 70 MMHG | HEIGHT: 65 IN | SYSTOLIC BLOOD PRESSURE: 134 MMHG | BODY MASS INDEX: 22.49 KG/M2 | HEART RATE: 86 BPM | RESPIRATION RATE: 18 BRPM | OXYGEN SATURATION: 98 % | TEMPERATURE: 99 F | WEIGHT: 135 LBS

## 2019-02-21 DIAGNOSIS — R29.898 WEAKNESS OF BOTH LEGS: ICD-10-CM

## 2019-02-21 DIAGNOSIS — R53.1 GENERALIZED WEAKNESS: ICD-10-CM

## 2019-02-21 DIAGNOSIS — R53.1 GENERALIZED WEAKNESS: Primary | ICD-10-CM

## 2019-02-21 LAB
CK SERPL-CCNC: 31 U/L (ref 26–192)
VIT B12 SERPL-MCNC: 326 PG/ML (ref 193–986)
VIT D+METAB SERPL-MCNC: 32.5 NG/ML (ref 30–100)

## 2019-02-21 PROCEDURE — 36415 COLL VENOUS BLD VENIPUNCTURE: CPT | Performed by: OTHER

## 2019-02-21 PROCEDURE — 82607 VITAMIN B-12: CPT | Performed by: OTHER

## 2019-02-21 PROCEDURE — 82550 ASSAY OF CK (CPK): CPT | Performed by: OTHER

## 2019-02-21 PROCEDURE — 99244 OFF/OP CNSLTJ NEW/EST MOD 40: CPT | Performed by: OTHER

## 2019-02-21 PROCEDURE — 82306 VITAMIN D 25 HYDROXY: CPT | Performed by: OTHER

## 2019-02-21 NOTE — PROGRESS NOTES
HPI:    Patient ID: Felice Suárez is a 66year old female. Thank you for requesting this consultation to us.  Below is the summary of my evaluation    HPI  Kerry Aragon is a 66year old female with recent diagnosis of appendiceal cancer s/p HIPE Right     benign   • D & C  1984   • FEMUR/KNEE SURG UNLISTED      left knee arthroscopy   • FNA (INDICATE SOURCE BELOW)      thyroid nodule x1 negative   • HIPEC SPECIMEN TO PATHOLOGY     • OTHER SURGICAL HISTORY  12/18/2018    Cystoscopy, Stent Placement Comment:Migraine, congestion  Dairy Products          HIVES  Corn                    HIVES  Cornstarch              OTHER (SEE COMMENTS)    Comment:MIGRAINE  Eggs Or Egg-Derived*    HIVES  Erythromycin              Gluten Flour              Gluten Meal (primary encounter diagnosis)  Weakness of both legs    Patient presenting with subjective leg weakness likely part of the generalized weakness due to neoplastic illness, recent major surgery and possible viral infection.     Electrolytes level fine and Hgb

## 2019-02-22 ENCOUNTER — TELEPHONE (OUTPATIENT)
Dept: NEUROLOGY | Facility: CLINIC | Age: 64
End: 2019-02-22

## 2019-02-22 NOTE — TELEPHONE ENCOUNTER
[employer] message sent to patient with the below results.       ----- Message from Jerome Richards MD sent at 2/22/2019  4:15 PM CST -----  Results are fine

## 2019-04-02 ENCOUNTER — PATIENT MESSAGE (OUTPATIENT)
Dept: INTERNAL MEDICINE CLINIC | Facility: CLINIC | Age: 64
End: 2019-04-02

## 2019-04-02 NOTE — TELEPHONE ENCOUNTER
Spoke with Yaniv Alexander who gave update of \"you're cured\" with normal recent scan.  Plan for 6 month follow up at [facility]

## 2019-04-10 PROBLEM — M65.311 TRIGGER FINGER OF RIGHT THUMB: Status: ACTIVE | Noted: 2019-04-10

## 2019-04-30 ENCOUNTER — PATIENT MESSAGE (OUTPATIENT)
Dept: INTERNAL MEDICINE CLINIC | Facility: CLINIC | Age: 64
End: 2019-04-30

## 2019-04-30 DIAGNOSIS — Z78.9 MEASLES, MUMPS, RUBELLA (MMR) VACCINATION STATUS UNKNOWN: Primary | ICD-10-CM

## 2019-05-02 ENCOUNTER — TELEPHONE (OUTPATIENT)
Dept: INTERNAL MEDICINE CLINIC | Facility: CLINIC | Age: 64
End: 2019-05-02

## 2019-05-02 NOTE — TELEPHONE ENCOUNTER
Received results from [facility] collected on 5/1/2019. Rubeola >300.0. Placed in your inbasket.

## 2019-06-27 ENCOUNTER — PATIENT MESSAGE (OUTPATIENT)
Dept: INTERNAL MEDICINE CLINIC | Facility: CLINIC | Age: 64
End: 2019-06-27

## 2019-07-30 ENCOUNTER — OFFICE VISIT (OUTPATIENT)
Dept: INTERNAL MEDICINE CLINIC | Facility: CLINIC | Age: 64
End: 2019-07-30
Payer: COMMERCIAL

## 2019-07-30 VITALS
HEART RATE: 70 BPM | BODY MASS INDEX: 22.49 KG/M2 | TEMPERATURE: 98 F | SYSTOLIC BLOOD PRESSURE: 116 MMHG | WEIGHT: 135 LBS | HEIGHT: 65 IN | OXYGEN SATURATION: 97 % | RESPIRATION RATE: 16 BRPM | DIASTOLIC BLOOD PRESSURE: 62 MMHG

## 2019-07-30 DIAGNOSIS — R10.10 UPPER ABDOMINAL PAIN: Primary | ICD-10-CM

## 2019-07-30 PROCEDURE — 99213 OFFICE O/P EST LOW 20 MIN: CPT | Performed by: INTERNAL MEDICINE

## 2019-07-30 RX ORDER — RANITIDINE 150 MG/1
150 TABLET ORAL 2 TIMES DAILY
Qty: 180 TABLET | Refills: 0 | Status: SHIPPED | OUTPATIENT
Start: 2019-07-30 | End: 2019-08-20

## 2019-07-30 NOTE — PROGRESS NOTES
[address] [facility] Internal Medicine Office Note  Chief Complaint:   Patient presents with:  Stomach Pain      HPI:   This is a 66year old female coming in for stomach pain and vomiting  HPI    She started taking zantac twice a day - 150mg twice daily Sister    • Other (SLE,RA,DM) Sister         I reviewed her's Past Medical History, Past Surgical History, Family History and   Social History updated shows  Social History    Tobacco Use      Smoking status: Never Smoker      Smokeless tobacco: Never Used 22.47 kg/m²  Estimated body mass index is 22.47 kg/m² as calculated from the following:    Height as of this encounter: 65\". Weight as of this encounter: 135 lb. Vital signs reviewed. Appears stated age, well groomed.   Physical Exam   Vitals reviewed [E]      Lipase [E]      Comp Metabolic Panel (14) [E]      Meds & Refills for this Visit:  Requested Prescriptions     Signed Prescriptions Disp Refills   • raNITIdine HCl 150 MG Oral Tab 180 tablet 0     Sig: Take 1 tablet (150 mg total) by mouth 2 (two)

## 2019-07-30 NOTE — PATIENT INSTRUCTIONS
Go to lab to get stool kit for H. Pylori test    Take zantac 150mg twice daily     Call Dr. Chelly Hilton at [phone] to schedule appointment with her or her associate    If symptoms happen again, get CT abdomen and blood work             mychart help

## 2019-07-31 ENCOUNTER — TELEPHONE (OUTPATIENT)
Dept: INTERNAL MEDICINE CLINIC | Facility: CLINIC | Age: 64
End: 2019-07-31

## 2019-07-31 ENCOUNTER — APPOINTMENT (OUTPATIENT)
Dept: LAB | Age: 64
End: 2019-07-31
Attending: INTERNAL MEDICINE
Payer: COMMERCIAL

## 2019-07-31 DIAGNOSIS — R10.10 UPPER ABDOMINAL PAIN: ICD-10-CM

## 2019-07-31 PROCEDURE — 87338 HPYLORI STOOL AG IA: CPT | Performed by: INTERNAL MEDICINE

## 2019-07-31 NOTE — TELEPHONE ENCOUNTER
Pt notified of CT denial per referral department (see note below). Pt had CT done in March with Dr. Kate Carmona at [facility]. Pt is due to have recheck in Oct with Dr. Kate Carmona as well.  Please advise what you would like to recommend for pt d/t CT denial. TY    Pt

## 2019-07-31 NOTE — TELEPHONE ENCOUNTER
Referral Notes   Number of Notes: 2   Type Date User Summary Attachment    07/31/2019  1:30 PM Naina Salmeron - -   Note    If we are to initiate a request for the same exam, DR. Es[facility] Willard should [facility].

## 2019-07-31 NOTE — TELEPHONE ENCOUNTER
Verbally spoke with Dr. Elaine Freeman. She stated ok for pt to use tagamet in replacement for zantac. Pt to resume taking as she was before (1 tab BID). Called pt and notified her of provider response.  She verbalized understanding and stated she buys the [facility]

## 2019-08-01 PROBLEM — R57.1 HYPOVOLEMIC SHOCK (HCC): Status: ACTIVE | Noted: 2018-12-19

## 2019-08-01 PROBLEM — C78.6 PERITONEAL CARCINOMATOSIS (HCC): Status: ACTIVE | Noted: 2018-12-18

## 2019-08-01 PROBLEM — E04.1 NONTOXIC SINGLE THYROID NODULE: Status: ACTIVE | Noted: 2018-10-26

## 2019-08-01 PROBLEM — E83.42 HYPOMAGNESEMIA: Status: ACTIVE | Noted: 2018-12-19

## 2019-08-01 PROBLEM — Z90.710 ACQUIRED ABSENCE OF BOTH CERVIX AND UTERUS: Status: ACTIVE | Noted: 2018-12-26

## 2019-08-01 PROBLEM — N60.19 FIBROCYSTIC BREAST DISEASE: Status: ACTIVE | Noted: 2018-10-26

## 2019-08-01 PROBLEM — D62 ANEMIA DUE TO ACUTE BLOOD LOSS: Status: ACTIVE | Noted: 2018-12-19

## 2019-08-01 PROBLEM — I83.90 VARICOSE VEINS OF LOWER EXTREMITY: Status: ACTIVE | Noted: 2018-10-26

## 2019-08-01 PROBLEM — E83.51 HYPOCALCEMIA: Status: ACTIVE | Noted: 2018-12-23

## 2019-08-01 PROBLEM — Z85.89 PERSONAL HISTORY OF MALIGNANT NEOPLASM OF OTHER ORGANS AND SYSTEMS: Status: ACTIVE | Noted: 2018-12-18

## 2019-08-01 PROBLEM — J32.9 SINUSITIS, CHRONIC: Status: ACTIVE | Noted: 2018-10-26

## 2019-08-01 PROBLEM — Z90.81 ACQUIRED ABSENCE OF SPLEEN: Status: ACTIVE | Noted: 2018-12-26

## 2019-08-01 PROBLEM — J95.811 POSTPROCEDURAL PNEUMOTHORAX: Status: ACTIVE | Noted: 2018-12-19

## 2019-08-01 PROBLEM — C18.1 MALIGNANT NEOPLASM OF APPENDIX (HCC): Status: ACTIVE | Noted: 2018-11-07

## 2019-08-01 PROBLEM — E87.6 HYPOKALEMIA: Status: ACTIVE | Noted: 2018-12-23

## 2019-08-01 PROBLEM — R73.9 HYPERGLYCEMIA: Status: RESOLVED | Noted: 2019-01-08 | Resolved: 2019-08-01

## 2019-08-01 PROBLEM — Z71.89 OTHER SPECIFIED COUNSELING: Status: ACTIVE | Noted: 2018-12-17

## 2019-08-01 PROBLEM — I48.91 ATRIAL FIBRILLATION (HCC): Status: ACTIVE | Noted: 2018-12-22

## 2019-08-01 PROBLEM — N94.89 MASS OF UTERINE ADNEXA: Status: ACTIVE | Noted: 2018-10-29

## 2019-08-01 PROBLEM — Z90.49 ACQUIRED ABSENCE OF OTHER SPECIFIED PARTS OF DIGESTIVE TRACT: Status: ACTIVE | Noted: 2018-12-26

## 2019-08-01 LAB — H PYLORI AG STL QL IA: NEGATIVE

## 2019-08-02 ENCOUNTER — LAB ENCOUNTER (OUTPATIENT)
Dept: LAB | Facility: HOSPITAL | Age: 64
End: 2019-08-02
Attending: INTERNAL MEDICINE
Payer: COMMERCIAL

## 2019-08-02 DIAGNOSIS — K29.30 CHRONIC SUPERFICIAL GASTRITIS: ICD-10-CM

## 2019-08-02 DIAGNOSIS — R12 HEARTBURN: ICD-10-CM

## 2019-08-02 DIAGNOSIS — K21.00 REFLUX ESOPHAGITIS: ICD-10-CM

## 2019-08-02 DIAGNOSIS — R19.7 DIARRHEA OF PRESUMED INFECTIOUS ORIGIN: Primary | ICD-10-CM

## 2019-08-02 LAB
HBV SURFACE AB SER QL: NONREACTIVE
HBV SURFACE AB SERPL IA-ACNC: 3.6 MIU/ML

## 2019-08-02 PROCEDURE — 82728 ASSAY OF FERRITIN: CPT | Performed by: INTERNAL MEDICINE

## 2019-08-02 PROCEDURE — 83516 IMMUNOASSAY NONANTIBODY: CPT | Performed by: INTERNAL MEDICINE

## 2019-08-02 PROCEDURE — 85025 COMPLETE CBC W/AUTO DIFF WBC: CPT | Performed by: INTERNAL MEDICINE

## 2019-08-02 PROCEDURE — 83540 ASSAY OF IRON: CPT | Performed by: INTERNAL MEDICINE

## 2019-08-02 PROCEDURE — 81376 HLA II TYPING 1 LOCUS LR: CPT

## 2019-08-02 PROCEDURE — 81383 HLA II TYPING 1 ALLELE HR: CPT

## 2019-08-02 PROCEDURE — 36415 COLL VENOUS BLD VENIPUNCTURE: CPT

## 2019-08-02 PROCEDURE — 86706 HEP B SURFACE ANTIBODY: CPT

## 2019-08-02 PROCEDURE — 80053 COMPREHEN METABOLIC PANEL: CPT | Performed by: INTERNAL MEDICINE

## 2019-08-02 PROCEDURE — 87340 HEPATITIS B SURFACE AG IA: CPT | Performed by: INTERNAL MEDICINE

## 2019-08-02 PROCEDURE — 82784 ASSAY IGA/IGD/IGG/IGM EACH: CPT | Performed by: INTERNAL MEDICINE

## 2019-08-02 PROCEDURE — 83550 IRON BINDING TEST: CPT | Performed by: INTERNAL MEDICINE

## 2019-08-02 PROCEDURE — 86704 HEP B CORE ANTIBODY TOTAL: CPT | Performed by: INTERNAL MEDICINE

## 2019-08-03 ENCOUNTER — APPOINTMENT (OUTPATIENT)
Dept: LAB | Age: 64
End: 2019-08-03
Attending: NURSE PRACTITIONER
Payer: COMMERCIAL

## 2019-08-03 DIAGNOSIS — R19.7 DIARRHEA, UNSPECIFIED TYPE: ICD-10-CM

## 2019-08-03 LAB — C DIFF TOX B STL QL: NEGATIVE

## 2019-08-03 PROCEDURE — 87493 C DIFF AMPLIFIED PROBE: CPT

## 2019-08-09 PROBLEM — R10.84 GENERALIZED ABDOMINAL PAIN: Status: ACTIVE | Noted: 2019-08-09

## 2019-08-09 PROBLEM — R19.7 DIARRHEA: Status: ACTIVE | Noted: 2019-08-09

## 2019-08-09 LAB
CELIAC (HLA-DQ8): NEGATIVE
CELIAC (HLA-DQA1*05): POSITIVE
CELIAC (HLA-DQB1*02): POSITIVE

## 2019-08-20 PROBLEM — Z85.89 PERSONAL HISTORY OF MALIGNANT NEOPLASM OF OTHER ORGANS AND SYSTEMS: Status: RESOLVED | Noted: 2018-12-18 | Resolved: 2019-08-20

## 2019-08-20 PROBLEM — C18.1 MALIGNANT NEOPLASM OF APPENDIX (HCC): Status: RESOLVED | Noted: 2018-11-07 | Resolved: 2019-08-20

## 2019-08-20 PROBLEM — E83.42 HYPOMAGNESEMIA: Status: RESOLVED | Noted: 2018-12-19 | Resolved: 2019-08-20

## 2019-08-20 PROBLEM — Z71.89 OTHER SPECIFIED COUNSELING: Status: RESOLVED | Noted: 2018-12-17 | Resolved: 2019-08-20

## 2019-08-20 PROBLEM — D72.829 LEUKOCYTOSIS: Status: RESOLVED | Noted: 2019-01-08 | Resolved: 2019-08-20

## 2019-08-20 PROBLEM — N12 PYELONEPHRITIS: Status: RESOLVED | Noted: 2019-01-08 | Resolved: 2019-08-20

## 2019-08-20 PROBLEM — I48.91 ATRIAL FIBRILLATION (HCC): Status: RESOLVED | Noted: 2018-12-22 | Resolved: 2019-08-20

## 2019-08-20 PROBLEM — Z86.79 HISTORY OF ATRIAL FIBRILLATION: Status: RESOLVED | Noted: 2019-01-02 | Resolved: 2019-08-20

## 2019-08-20 PROBLEM — R57.1 HYPOVOLEMIC SHOCK (HCC): Status: RESOLVED | Noted: 2018-12-19 | Resolved: 2019-08-20

## 2019-08-20 PROBLEM — N94.89 MASS OF UTERINE ADNEXA: Status: RESOLVED | Noted: 2018-10-29 | Resolved: 2019-08-20

## 2019-08-20 PROBLEM — E87.6 HYPOKALEMIA: Status: RESOLVED | Noted: 2018-12-23 | Resolved: 2019-08-20

## 2019-08-20 PROBLEM — J95.811 POSTPROCEDURAL PNEUMOTHORAX: Status: RESOLVED | Noted: 2018-12-19 | Resolved: 2019-08-20

## 2019-08-20 PROBLEM — D64.9 ANEMIA: Status: RESOLVED | Noted: 2019-01-02 | Resolved: 2019-08-20

## 2019-08-20 PROBLEM — D62 ANEMIA DUE TO ACUTE BLOOD LOSS: Status: RESOLVED | Noted: 2018-12-19 | Resolved: 2019-08-20

## 2019-08-20 PROBLEM — C78.6 PERITONEAL CARCINOMATOSIS (HCC): Status: RESOLVED | Noted: 2018-12-18 | Resolved: 2019-08-20

## 2019-08-20 PROBLEM — E83.51 HYPOCALCEMIA: Status: RESOLVED | Noted: 2018-12-23 | Resolved: 2019-08-20

## 2019-09-03 PROBLEM — M65.312 TRIGGER FINGER OF LEFT THUMB: Status: ACTIVE | Noted: 2019-09-03

## 2019-09-29 ENCOUNTER — PATIENT MESSAGE (OUTPATIENT)
Dept: INTERNAL MEDICINE CLINIC | Facility: CLINIC | Age: 64
End: 2019-09-29

## 2019-09-30 NOTE — TELEPHONE ENCOUNTER
Pt had TDAP on 8/25/2011. Please advise if she would need another d/t pt daughter having baby. TY    Typically insurance requires pneumovax 23 to be given 1 year and 1 day after the prevnar 13 so pt would be due around 1/3/20 (please advise if agree).

## 2019-09-30 NOTE — TELEPHONE ENCOUNTER
From: Jennifer Wilkes  To: Ivan Stewart MD  Sent: 9/29/2019 10:15 PM CDT  Subject: Non-Urgent Medical Question    Dr. Adelaide Wesley,  My daughter is expecting a baby the beginning of January.  Her doctor recommended that myself and my  get the whooping

## 2019-11-22 ENCOUNTER — OFFICE VISIT (OUTPATIENT)
Dept: INTERNAL MEDICINE CLINIC | Facility: CLINIC | Age: 64
End: 2019-11-22
Payer: COMMERCIAL

## 2019-11-22 VITALS
OXYGEN SATURATION: 97 % | SYSTOLIC BLOOD PRESSURE: 110 MMHG | DIASTOLIC BLOOD PRESSURE: 68 MMHG | BODY MASS INDEX: 22.86 KG/M2 | TEMPERATURE: 98 F | HEART RATE: 76 BPM | RESPIRATION RATE: 16 BRPM | HEIGHT: 66 IN | WEIGHT: 142.25 LBS

## 2019-11-22 DIAGNOSIS — Z90.81 HISTORY OF SPLENECTOMY: ICD-10-CM

## 2019-11-22 DIAGNOSIS — Z12.31 ENCOUNTER FOR SCREENING MAMMOGRAM FOR BREAST CANCER: ICD-10-CM

## 2019-11-22 DIAGNOSIS — Z23 NEED FOR VACCINATION AGAINST STREPTOCOCCUS PNEUMONIAE: ICD-10-CM

## 2019-11-22 DIAGNOSIS — Z23 NEED FOR MENINGITIS VACCINATION: ICD-10-CM

## 2019-11-22 DIAGNOSIS — Z00.00 ENCOUNTER FOR ROUTINE ADULT MEDICAL EXAMINATION: Primary | ICD-10-CM

## 2019-11-22 PROCEDURE — 90734 MENACWYD/MENACWYCRM VACC IM: CPT | Performed by: INTERNAL MEDICINE

## 2019-11-22 PROCEDURE — 90732 PPSV23 VACC 2 YRS+ SUBQ/IM: CPT | Performed by: INTERNAL MEDICINE

## 2019-11-22 PROCEDURE — 99396 PREV VISIT EST AGE 40-64: CPT | Performed by: INTERNAL MEDICINE

## 2019-11-22 PROCEDURE — 90471 IMMUNIZATION ADMIN: CPT | Performed by: INTERNAL MEDICINE

## 2019-11-22 PROCEDURE — 90472 IMMUNIZATION ADMIN EACH ADD: CPT | Performed by: INTERNAL MEDICINE

## 2019-11-22 NOTE — PROGRESS NOTES
[facility] Internal Medicine Office Note  Chief Complaint:   Patient presents with:  CPX      HPI:   This is a 60year old female coming in for physical   HPI    She feels well   She had a recent visit at [facility]  Reviewed labs - normal CBC, Past Surgical History, Family History and   Social History updated shows  Social History    Tobacco Use      Smoking status: Never Smoker      Smokeless tobacco: Never Used    Alcohol use: No      Alcohol/week: 0.0 standard drinks    Drug use: No    Allerg No distress. HENT:   Head: Normocephalic and atraumatic. Right Ear: Tympanic membrane is not erythematous. Left Ear: Tympanic membrane is not erythematous. Mouth/Throat: No posterior oropharyngeal erythema.    Eyes: Conjunctivae are normal.   Neck: worsening or changing symptoms. Patient is to call with any side effects or complications from the treatments as a result of today.      Luci Tom MD

## 2019-11-22 NOTE — PROGRESS NOTES
REASON FOR VISIT:    Pop Poe is a 60year old female who presents for a Medicare Annual Wellness visit.     She feels well   She had a recent visit at [facility] [place] clinic  Reviewed labs - normal CBC, CMP  No signs of recurrence or obstruction on CT scan 1/9/2019 1/8/2019 10/23/2018 11/18/2015   BUN 7 - 18 mg/dL 11 13 7(L) 8 10 11   Creatinine 0.55 - 1.02 mg/dL 0.94 0.61 0.52(L) 0.64 0.84 0.83     AST and ALT Latest Ref Rng & Units 8/2/2019 2/12/2019 1/8/2019 10/23/2018 11/18/2015   AST 15 - 37 U/L 16 35 2 congestion  Dairy Products          HIVES  Corn                    HIVES  Cornstarch              OTHER (SEE COMMENTS)    Comment:MIGRAINE  Eggs Or Egg-Derived*    HIVES  Erythromycin              Gluten Flour              Gluten Meal             OTHER (SE • HIPEC SPECIMEN TO PATHOLOGY     • OTHER SURGICAL HISTORY  12/18/2018    Cystoscopy, Stent Placement @ [address]   • [facility]  8/19   • TOTAL ABDOM HYSTERECTOMY  12/19      Family History   Problem Relation Age of Onset   • Diabetes Mo 97%   BMI 22.96 kg/m²    > BP Readings from Last 3 Encounters:  11/22/19 : 110/68  10/31/19 : 118/80  08/20/19 : 124/72    GENERAL: well developed, well nourished, in no apparent distress  SKIN: no rashes, no suspicious lesions  HEENT: atraumatic, normocep

## 2020-03-27 ENCOUNTER — PATIENT MESSAGE (OUTPATIENT)
Dept: INTERNAL MEDICINE CLINIC | Facility: CLINIC | Age: 65
End: 2020-03-27

## 2020-03-27 NOTE — TELEPHONE ENCOUNTER
She has been having pain at rectal area for past 2 days. When she sits, she feels it. She feels a hard bump on exam internally. She has a history of hemorrhoids. She started using preparation H suppository yesterday. She is currently in [place].     Recommend se

## 2020-04-07 ENCOUNTER — PATIENT MESSAGE (OUTPATIENT)
Dept: INTERNAL MEDICINE CLINIC | Facility: CLINIC | Age: 65
End: 2020-04-07

## 2020-04-07 NOTE — TELEPHONE ENCOUNTER
From: [facility]  To: Leonardo Patrick MD  Sent: 4/7/2020 9:56 AM CDT  Subject: Non-Urgent Medical Question    Dr Yuni Lucas,  Would like to talk to you and update you on my condition we talked about a week ago. We are still in [place].  You can reach me a

## 2020-05-18 ENCOUNTER — PATIENT MESSAGE (OUTPATIENT)
Dept: INTERNAL MEDICINE CLINIC | Facility: CLINIC | Age: 65
End: 2020-05-18

## 2020-05-18 NOTE — TELEPHONE ENCOUNTER
From: Keith Robertson  To: Ama Winston MD  Sent: 5/18/2020 10:21 AM CDT  Subject: Visit Follow-up Question    Dr. Angelika Gerard,  In our last conversation we talked about me staying in [place] through the end of May since this was a safer place to be as I mi
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
## 2020-12-07 ENCOUNTER — PATIENT MESSAGE (OUTPATIENT)
Dept: INTERNAL MEDICINE CLINIC | Facility: CLINIC | Age: 65
End: 2020-12-07

## 2020-12-08 NOTE — TELEPHONE ENCOUNTER
From: Svitlana Martel  To: Melissa Loyd MD  Sent: 12/7/2020 6:33 PM CST  Subject: Non-Urgent Medical Question    Dr. Bonna Buerger,  I was able to print up the letter so I will have it if necessary. Thank you for that.  Since I will miss my checkup I plan on s

## 2020-12-17 ENCOUNTER — PATIENT MESSAGE (OUTPATIENT)
Dept: INTERNAL MEDICINE CLINIC | Facility: CLINIC | Age: 65
End: 2020-12-17

## 2021-03-22 ENCOUNTER — PATIENT MESSAGE (OUTPATIENT)
Dept: INTERNAL MEDICINE CLINIC | Facility: CLINIC | Age: 66
End: 2021-03-22

## 2021-03-22 DIAGNOSIS — Z12.31 ENCOUNTER FOR SCREENING MAMMOGRAM FOR BREAST CANCER: ICD-10-CM

## 2021-03-22 DIAGNOSIS — Z12.31 ENCOUNTER FOR SCREENING MAMMOGRAM FOR MALIGNANT NEOPLASM OF BREAST: Primary | ICD-10-CM

## 2021-03-22 NOTE — TELEPHONE ENCOUNTER
From: [facility]  To: Leonardo Patrick MD  Sent: 3/22/2021 2:49 PM CDT  Subject: Non-Urgent Medical Question    I'm coming in for my physical with Dr. Yuni Lucas on May 18 at 8:30 AM. I am over due for my mammogram and I was wondering if I can get a scrip

## 2021-05-10 ENCOUNTER — HOSPITAL ENCOUNTER (OUTPATIENT)
Dept: MAMMOGRAPHY | Age: 66
Discharge: HOME | End: 2021-05-10
Attending: INTERNAL MEDICINE
Payer: COMMERCIAL

## 2021-05-10 DIAGNOSIS — Z12.31 ENCOUNTER FOR SCREENING MAMMOGRAM FOR MALIGNANT NEOPLASM OF BREAST: ICD-10-CM

## 2021-05-10 PROCEDURE — 77063 BREAST TOMOSYNTHESIS BI: CPT | Performed by: INTERNAL MEDICINE

## 2021-05-10 PROCEDURE — 77067 SCR MAMMO BI INCL CAD: CPT | Performed by: INTERNAL MEDICINE

## 2021-05-13 ENCOUNTER — LAB ENCOUNTER (OUTPATIENT)
Dept: LAB | Age: 66
End: 2021-05-13
Attending: INTERNAL MEDICINE
Payer: COMMERCIAL

## 2021-05-13 ENCOUNTER — OFFICE VISIT (OUTPATIENT)
Dept: INTERNAL MEDICINE CLINIC | Facility: CLINIC | Age: 66
End: 2021-05-13
Payer: COMMERCIAL

## 2021-05-13 VITALS
HEIGHT: 65 IN | HEART RATE: 70 BPM | BODY MASS INDEX: 25.99 KG/M2 | SYSTOLIC BLOOD PRESSURE: 140 MMHG | WEIGHT: 156 LBS | OXYGEN SATURATION: 99 % | TEMPERATURE: 98 F | DIASTOLIC BLOOD PRESSURE: 90 MMHG | RESPIRATION RATE: 16 BRPM

## 2021-05-13 DIAGNOSIS — Z00.00 ENCOUNTER FOR ROUTINE ADULT MEDICAL EXAMINATION: Primary | ICD-10-CM

## 2021-05-13 DIAGNOSIS — R22.1 LUMP IN NECK: ICD-10-CM

## 2021-05-13 DIAGNOSIS — Z90.81 HISTORY OF SPLENECTOMY: ICD-10-CM

## 2021-05-13 DIAGNOSIS — D22.9 ATYPICAL MOLE: ICD-10-CM

## 2021-05-13 DIAGNOSIS — Z00.00 LABORATORY EXAM ORDERED AS PART OF ROUTINE GENERAL MEDICAL EXAMINATION: ICD-10-CM

## 2021-05-13 PROCEDURE — 99396 PREV VISIT EST AGE 40-64: CPT | Performed by: INTERNAL MEDICINE

## 2021-05-13 PROCEDURE — 84439 ASSAY OF FREE THYROXINE: CPT | Performed by: INTERNAL MEDICINE

## 2021-05-13 PROCEDURE — 80061 LIPID PANEL: CPT | Performed by: INTERNAL MEDICINE

## 2021-05-13 PROCEDURE — 3077F SYST BP >= 140 MM HG: CPT | Performed by: INTERNAL MEDICINE

## 2021-05-13 PROCEDURE — 3080F DIAST BP >= 90 MM HG: CPT | Performed by: INTERNAL MEDICINE

## 2021-05-13 PROCEDURE — 3008F BODY MASS INDEX DOCD: CPT | Performed by: INTERNAL MEDICINE

## 2021-05-13 PROCEDURE — 80050 GENERAL HEALTH PANEL: CPT | Performed by: INTERNAL MEDICINE

## 2021-05-22 ENCOUNTER — PATIENT MESSAGE (OUTPATIENT)
Dept: INTERNAL MEDICINE CLINIC | Facility: CLINIC | Age: 66
End: 2021-05-22

## 2021-05-22 ENCOUNTER — HOSPITAL ENCOUNTER (OUTPATIENT)
Dept: ULTRASOUND IMAGING | Age: 66
Discharge: HOME | End: 2021-05-22
Attending: INTERNAL MEDICINE
Payer: COMMERCIAL

## 2021-05-22 DIAGNOSIS — R22.1 LUMP IN NECK: ICD-10-CM

## 2021-05-22 PROCEDURE — 76536 US EXAM OF HEAD AND NECK: CPT | Performed by: INTERNAL MEDICINE

## 2021-05-24 NOTE — TELEPHONE ENCOUNTER
From: Ashu Rivera  To: Madelyn Mendez MD  Sent: 5/22/2021 6:42 PM CDT  Subject: Test Results Question    Dr. Sherry Quiroz,  I guess I'm confused by the test results.  Did they come to any conclusion on the lump by my clavicle or just examined my thyroid nod

## 2021-05-27 NOTE — TELEPHONE ENCOUNTER
Discussed with patient  She has an appointment June 25 at [facility] and has plans for CT abdomen with contrast. Although ultrasound looks reassuring, it is not clear if the tech looked at the area of question.  CT chest with contrast is recommended for fu

## 2021-05-27 NOTE — TELEPHONE ENCOUNTER
Discussed with radiologist today who reviewed images. Per the notation of the tech, the area in question corresponds to the isthmus of the thyroid with no abnormalities. We discussed the location of the palpable mass at the clavicle/sternoclavicular joint.

## 2021-06-10 ENCOUNTER — OFFICE VISIT (OUTPATIENT)
Dept: INTERNAL MEDICINE CLINIC | Facility: CLINIC | Age: 66
End: 2021-06-10
Payer: COMMERCIAL

## 2021-06-10 ENCOUNTER — TELEPHONE (OUTPATIENT)
Dept: INTERNAL MEDICINE CLINIC | Facility: CLINIC | Age: 66
End: 2021-06-10

## 2021-06-10 VITALS
HEIGHT: 65 IN | OXYGEN SATURATION: 99 % | DIASTOLIC BLOOD PRESSURE: 86 MMHG | SYSTOLIC BLOOD PRESSURE: 134 MMHG | WEIGHT: 159.81 LBS | HEART RATE: 62 BPM | RESPIRATION RATE: 16 BRPM | TEMPERATURE: 98 F | BODY MASS INDEX: 26.63 KG/M2

## 2021-06-10 DIAGNOSIS — K42.9 UMBILICAL HERNIA WITHOUT OBSTRUCTION AND WITHOUT GANGRENE: ICD-10-CM

## 2021-06-10 DIAGNOSIS — Z01.810 PREOPERATIVE CARDIOVASCULAR EXAMINATION: Primary | ICD-10-CM

## 2021-06-10 PROCEDURE — 3079F DIAST BP 80-89 MM HG: CPT | Performed by: INTERNAL MEDICINE

## 2021-06-10 PROCEDURE — 3008F BODY MASS INDEX DOCD: CPT | Performed by: INTERNAL MEDICINE

## 2021-06-10 PROCEDURE — 93000 ELECTROCARDIOGRAM COMPLETE: CPT | Performed by: INTERNAL MEDICINE

## 2021-06-10 PROCEDURE — 3075F SYST BP GE 130 - 139MM HG: CPT | Performed by: INTERNAL MEDICINE

## 2021-06-10 PROCEDURE — 99214 OFFICE O/P EST MOD 30 MIN: CPT | Performed by: INTERNAL MEDICINE

## 2021-06-10 NOTE — PROGRESS NOTES
Preoperative History and Physical Internal Medicine    CC: Patient presents with:  Pre-Op Exam: hernia repair with Dr. Tc Marie is 63year old presenting for a preoperative exam.    This patient is having surgery on date: 6/28/ ethmoidal air cell mucosal thickening   • Stress incontinence    • Thyroid nodule     s/p FNA, negative   • URI (upper respiratory infection) 5/04    mild bronchitis   • Varicose veins     symptomatic   • Vomiting        Past Surgical History:   Procedure DAILY OR), Take by mouth daily. , Disp: , Rfl:   •  S-Adenosylmethionine (DANYEL-E) 400 MG Oral Tab, Take 1 tablet by mouth daily. , Disp: , Rfl:   •  LUTEIN OR, Take 1 tablet by mouth daily. , Disp: , Rfl:     Review of Systems   Review of Systems   Constitut 0.85    • BUN/CREA Ratio 05/13/2021 18.8    • Calcium, Total 05/13/2021 9.1    • Calculated Osmolality 05/13/2021 288    • GFR, Non- 05/13/2021 73    • GFR, -American 05/13/2021 84    • AST 05/13/2021 23    • ALT 05/13/2021 21    • A follows:  Cardiac Risk Assessment (Elevated Cardiac risk greater than 1% chance of cardiac death or MI by RCRI is defined as having more than 1 of the following: High risk vascular surgery, CAD, h/o stroke, Insulin Dependant DM, CHF, CKD Cr>2) No high risk

## 2021-06-10 NOTE — TELEPHONE ENCOUNTER
Faxed H&P, echo results, lab results, and ekg to [address]- Dr. Yonatan Hollins   Fax confirmation received   EKG sent to scan   Copy placed in accordion

## 2022-04-26 NOTE — TELEPHONE ENCOUNTER
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     Presenting for follow-up of chest pain.   Seen in ED 2 days ago for chest pain that began a few hours after playing soccer. Mom thinks she was overheated as she was wearing a thick sweater. In ED CXR normal. EKG NSR, LVH. Hgb near baseline. Improved with ibuprofen. Discharged home with instructions to follow-up.    Last dose of motrin 9pm. No motrin this morning. Chest began hurting at school while sitting and listening to a book. Seen by nurse with normal vitals per mom, no ibuprofen at that time. Mom picked her up and scheduled appointment due to persistent pain and was instructed in ED to follow-up with PMD.    Currently reports pain 6/10, unchanged from earlier today. More quiet but otherwise acting her normal self. Not worse with palpation or deep breaths. Denies fever, chills, SOB, cough, HA, abdominal pain, palpitations, dizziness, trauma. BM daily, no miralax.     Review of Systems   Constitutional: Negative for activity change, appetite change, chills and fever.   HENT: Negative for congestion and sore throat.    Respiratory: Negative for cough, shortness of breath and wheezing.    Cardiovascular: Positive for chest pain. Negative for palpitations.   Gastrointestinal: Negative for abdominal pain, constipation and diarrhea.   Genitourinary: Negative for difficulty urinating.   Musculoskeletal: Negative for arthralgias, myalgias and neck pain.   Neurological: Negative for dizziness, syncope, weakness, light-headedness and numbness.     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   Medications:   Current Outpatient Medications   Medication Sig Dispense Refill   • ibuprofen (Childrens Ibuprofen 100) 100 MG/5ML suspension Take 200 mg by mouth as needed for Fever.     • Pediatric Multivit-Minerals-C (EQ MULTIVITAMINS GUMMY CHILD PO) Take 2 each by mouth as needed.      • polyethylene glycol (MIRALAX) 17 g packet Take 8.5 g by mouth daily. Stir and dissolve powder in any 4 to 8 ounces of beverage, then drink.     • Magnesium Hydroxide (MILK OF MAGNESIA PO) Take 30 mLs by mouth as needed.        No current facility-administered medications for this visit.       Visit Vitals  /59   Pulse 110   Temp 97.9 °F (36.6 °C)   Wt 22 kg (48 lb 8 oz)   SpO2 100%     Growth percentiles: 29 %ile (Z= -0.55) based on [facility] (Girls, 2-20 Years) weight-for-age data using vitals from 4/26/2022.     Physical Exam:  Physical Exam  Constitutional:       General: She is active. She is not in acute distress.     Appearance: Normal appearance. She is well-developed. She is not toxic-appearing.   HENT:      Head: Normocephalic.      Right Ear: Tympanic membrane normal.      Left Ear: Tympanic membrane normal.      Nose: Nose normal. No congestion.      Mouth/Throat:      Mouth: Mucous membranes are moist.      Pharynx: No posterior oropharyngeal erythema.   Eyes:      Pupils: Pupils are equal, round, and reactive to light.   Cardiovascular:      Rate and Rhythm: Normal rate and regular rhythm.      Pulses: Normal pulses.      Heart sounds: Normal heart sounds. No murmur heard.  Pulmonary:      Effort: Pulmonary effort is normal. No respiratory distress or retractions.      Breath sounds: Normal breath sounds. No decreased air movement. No wheezing.   Abdominal:      General: Abdomen is flat. Bowel  sounds are normal. There is no distension.      Palpations: Abdomen is soft. There is no mass.      Tenderness: There is no abdominal tenderness.   Musculoskeletal:         General: No tenderness or signs of injury.      Cervical back: Neck supple.   Lymphadenopathy:      Cervical: No cervical adenopathy.   Skin:     General: Skin is warm.      Capillary Refill: Capillary refill takes less than 2 seconds.   Neurological:      General: No focal deficit present.      Mental Status: She is alert.         Results:  No results found for this visit on 04/26/22.      Assessment:  Smita is a 8 yo F with sickle beta+ thalassemia presenting for follow-up of chest pain. Pain is unchanged from ED visit and was improving with ibuprofen. No cough, SOB, fever with 100% sat RA making ACS unlikely. Recent workup in ED with normal CXR, EKG with LVH. LVH likely related to baseline anemia.    Assessment & Plan   Problem List Items Addressed This Visit        Cardiac and Vasculature    Other chest pain - Primary          Plan:  - Continue using ibuprofen for pain every 6 hours  - If develops new cough, shortness of breath, difficulty breathing fever, acute worsening of pain, pain stops responding to ibuprofen, or changes in activity call clinic or return to ED.   - Consider repeat EKG with echo in future      Information is discussed and given to the patient and family in the After Visit Summary.    Supportive care encouraged including: increasing oral fluid intake, use of cool-mist humidifiers in the home or steam showers and OTC pain/fever reducing medications as needed by symptoms.    Signs of concern to monitor for are: A fever persisting after 5 days, or a new fever should developing, if your child is not drinking or eating well enough for urine at least every 6-8 hours, much less active or appearing lethargic or having breathing difficulty---this warrants urgent evaluation in the Immediate Care Center, Emergency Department or  office if possible.  Please call with any questions or concerns [phone].      Tamanna Stover MD  Pediatric Resident - PGY1

## 2022-08-01 ENCOUNTER — TELEPHONE (OUTPATIENT)
Dept: INTERNAL MEDICINE CLINIC | Facility: CLINIC | Age: 67
End: 2022-08-01

## 2022-08-01 NOTE — TELEPHONE ENCOUNTER
Patient would like to speak to a nurse regarding getting a letter for jury duty. Did not want to provide any other information.

## 2022-08-02 NOTE — TELEPHONE ENCOUNTER
Please advise:     Pt is to report to [facility] on 8/15/22. Pt states she was diagnosed with Hypoglycemia around 1987. She has to eat frequent meals and snacks every 2 hours or else she gets lightheaded. In the past when she's done jury duty, she states it's very embarrassing because she has to raise her hand every 2 hours and ask to be excused infront of everyone to have a snack. She also feels it's a HIPPA violation. They informed her that if she can get a doctor's note stating she cannot report for jury duty due to a medical condition, she can be excused from it. LS: would you write letter?  Does pt need OV or VV first?

## 2022-08-04 ENCOUNTER — PATIENT MESSAGE (OUTPATIENT)
Dept: INTERNAL MEDICINE CLINIC | Facility: CLINIC | Age: 67
End: 2022-08-04

## 2022-11-19 ENCOUNTER — APPOINTMENT (OUTPATIENT)
Dept: GENERAL RADIOLOGY | Facility: HOSPITAL | Age: 67
End: 2022-11-19
Attending: EMERGENCY MEDICINE
Payer: MEDICARE

## 2022-11-19 ENCOUNTER — HOSPITAL ENCOUNTER (EMERGENCY)
Facility: HOSPITAL | Age: 67
Discharge: HOME | End: 2022-11-19
Attending: EMERGENCY MEDICINE
Payer: MEDICARE

## 2022-11-19 VITALS
BODY MASS INDEX: 25.71 KG/M2 | SYSTOLIC BLOOD PRESSURE: 129 MMHG | OXYGEN SATURATION: 97 % | HEART RATE: 68 BPM | WEIGHT: 160 LBS | HEIGHT: 66 IN | TEMPERATURE: 98 F | RESPIRATION RATE: 14 BRPM | DIASTOLIC BLOOD PRESSURE: 70 MMHG

## 2022-11-19 DIAGNOSIS — I49.3 PVCS (PREMATURE VENTRICULAR CONTRACTIONS): Primary | ICD-10-CM

## 2022-11-19 LAB
ALBUMIN SERPL-MCNC: 3.7 G/DL (ref 3.4–5)
ALBUMIN/GLOB SERPL: 0.9 {RATIO} (ref 1–2)
ALP LIVER SERPL-CCNC: 96 U/L
ALT SERPL-CCNC: 23 U/L
ANION GAP SERPL CALC-SCNC: 5 MMOL/L (ref 0–18)
AST SERPL-CCNC: 28 U/L (ref 15–37)
BASOPHILS # BLD AUTO: 0.05 X10(3) UL (ref 0–0.2)
BASOPHILS NFR BLD AUTO: 0.5 %
BILIRUB SERPL-MCNC: 0.4 MG/DL (ref 0.1–2)
BUN BLD-MCNC: 17 MG/DL (ref 7–18)
CALCIUM BLD-MCNC: 9.8 MG/DL (ref 8.5–10.1)
CHLORIDE SERPL-SCNC: 111 MMOL/L (ref 98–112)
CO2 SERPL-SCNC: 24 MMOL/L (ref 21–32)
CREAT BLD-MCNC: 0.95 MG/DL
D DIMER PPP FEU-MCNC: <0.27 UG/ML FEU (ref ?–0.65)
EOSINOPHIL # BLD AUTO: 0.03 X10(3) UL (ref 0–0.7)
EOSINOPHIL NFR BLD AUTO: 0.3 %
ERYTHROCYTE [DISTWIDTH] IN BLOOD BY AUTOMATED COUNT: 14.1 %
GFR SERPLBLD BASED ON 1.73 SQ M-ARVRAT: 66 ML/MIN/1.73M2 (ref 60–?)
GLOBULIN PLAS-MCNC: 4.3 G/DL (ref 2.8–4.4)
GLUCOSE BLD-MCNC: 129 MG/DL (ref 70–99)
HCT VFR BLD AUTO: 45.4 %
HGB BLD-MCNC: 15.5 G/DL
IMM GRANULOCYTES # BLD AUTO: 0.02 X10(3) UL (ref 0–1)
IMM GRANULOCYTES NFR BLD: 0.2 %
LYMPHOCYTES # BLD AUTO: 1.37 X10(3) UL (ref 1–4)
LYMPHOCYTES NFR BLD AUTO: 14.7 %
MCH RBC QN AUTO: 34.6 PG (ref 26–34)
MCHC RBC AUTO-ENTMCNC: 34.1 G/DL (ref 31–37)
MCV RBC AUTO: 101.3 FL
MONOCYTES # BLD AUTO: 0.65 X10(3) UL (ref 0.1–1)
MONOCYTES NFR BLD AUTO: 7 %
NEUTROPHILS # BLD AUTO: 7.21 X10 (3) UL (ref 1.5–7.7)
NEUTROPHILS # BLD AUTO: 7.21 X10(3) UL (ref 1.5–7.7)
NEUTROPHILS NFR BLD AUTO: 77.3 %
OSMOLALITY SERPL CALC.SUM OF ELEC: 293 MOSM/KG (ref 275–295)
PLATELET # BLD AUTO: 346 10(3)UL (ref 150–450)
POTASSIUM SERPL-SCNC: 4.6 MMOL/L (ref 3.5–5.1)
PROT SERPL-MCNC: 8 G/DL (ref 6.4–8.2)
RBC # BLD AUTO: 4.48 X10(6)UL
SODIUM SERPL-SCNC: 140 MMOL/L (ref 136–145)
TROPONIN I HIGH SENSITIVITY: 8 NG/L
TSI SER-ACNC: 0.76 MIU/ML (ref 0.36–3.74)
WBC # BLD AUTO: 9.3 X10(3) UL (ref 4–11)

## 2022-11-19 PROCEDURE — 84443 ASSAY THYROID STIM HORMONE: CPT | Performed by: EMERGENCY MEDICINE

## 2022-11-19 PROCEDURE — 93010 ELECTROCARDIOGRAM REPORT: CPT

## 2022-11-19 PROCEDURE — 80053 COMPREHEN METABOLIC PANEL: CPT | Performed by: EMERGENCY MEDICINE

## 2022-11-19 PROCEDURE — 84484 ASSAY OF TROPONIN QUANT: CPT | Performed by: EMERGENCY MEDICINE

## 2022-11-19 PROCEDURE — 99285 EMERGENCY DEPT VISIT HI MDM: CPT

## 2022-11-19 PROCEDURE — 85379 FIBRIN DEGRADATION QUANT: CPT | Performed by: EMERGENCY MEDICINE

## 2022-11-19 PROCEDURE — 93005 ELECTROCARDIOGRAM TRACING: CPT

## 2022-11-19 PROCEDURE — 71045 X-RAY EXAM CHEST 1 VIEW: CPT | Performed by: EMERGENCY MEDICINE

## 2022-11-19 PROCEDURE — 85025 COMPLETE CBC W/AUTO DIFF WBC: CPT | Performed by: EMERGENCY MEDICINE

## 2022-11-19 PROCEDURE — 36415 COLL VENOUS BLD VENIPUNCTURE: CPT

## 2022-11-19 NOTE — ED INITIAL ASSESSMENT (HPI)
Pain at the centre of the chest radiating to right and left arm with palpitations  Since morning 2 episodes as per patient each one lasted for 20 mins . patient feels nauseated and feeling tired with each episode.  History of afib  Not on any medications

## 2022-11-20 LAB
ATRIAL RATE: 69 BPM
ATRIAL RATE: 69 BPM
P AXIS: 48 DEGREES
P AXIS: 51 DEGREES
P-R INTERVAL: 164 MS
P-R INTERVAL: 174 MS
Q-T INTERVAL: 392 MS
Q-T INTERVAL: 396 MS
QRS DURATION: 78 MS
QRS DURATION: 82 MS
QTC CALCULATION (BEZET): 420 MS
QTC CALCULATION (BEZET): 424 MS
R AXIS: -12 DEGREES
R AXIS: -6 DEGREES
T AXIS: 35 DEGREES
T AXIS: 44 DEGREES
VENTRICULAR RATE: 69 BPM
VENTRICULAR RATE: 69 BPM

## 2022-11-21 ENCOUNTER — HOSPITAL ENCOUNTER (OUTPATIENT)
Dept: CT IMAGING | Age: 67
Discharge: HOME | End: 2022-11-21
Attending: INTERNAL MEDICINE
Payer: MEDICARE

## 2022-11-21 ENCOUNTER — TELEPHONE (OUTPATIENT)
Dept: INTERNAL MEDICINE CLINIC | Facility: CLINIC | Age: 67
End: 2022-11-21

## 2022-11-21 ENCOUNTER — OFFICE VISIT (OUTPATIENT)
Dept: INTERNAL MEDICINE CLINIC | Facility: CLINIC | Age: 67
End: 2022-11-21
Payer: MEDICARE

## 2022-11-21 VITALS
TEMPERATURE: 99 F | WEIGHT: 170.63 LBS | SYSTOLIC BLOOD PRESSURE: 120 MMHG | OXYGEN SATURATION: 97 % | BODY MASS INDEX: 27.42 KG/M2 | HEART RATE: 79 BPM | DIASTOLIC BLOOD PRESSURE: 80 MMHG | HEIGHT: 66 IN

## 2022-11-21 DIAGNOSIS — R10.9 RIGHT FLANK PAIN: ICD-10-CM

## 2022-11-21 DIAGNOSIS — E78.00 PURE HYPERCHOLESTEROLEMIA: ICD-10-CM

## 2022-11-21 DIAGNOSIS — Z00.00 WELLNESS EXAMINATION: Primary | ICD-10-CM

## 2022-11-21 DIAGNOSIS — Z12.31 ENCOUNTER FOR SCREENING MAMMOGRAM FOR BREAST CANCER: ICD-10-CM

## 2022-11-21 DIAGNOSIS — Z78.0 POSTMENOPAUSAL ESTROGEN DEFICIENCY: ICD-10-CM

## 2022-11-21 DIAGNOSIS — Z90.81 HISTORY OF SPLENECTOMY: ICD-10-CM

## 2022-11-21 DIAGNOSIS — Z85.09 HISTORY OF MALIGNANT NEOPLASM OF APPENDIX: ICD-10-CM

## 2022-11-21 LAB
APPEARANCE: CLEAR
BILIRUBIN: NEGATIVE
GLUCOSE (URINE DIPSTICK): NEGATIVE MG/DL
KETONES (URINE DIPSTICK): NEGATIVE MG/DL
LEUKOCYTES: NEGATIVE
MULTISTIX LOT#: ABNORMAL NUMERIC
NITRITE, URINE: NEGATIVE
PH, URINE: 5.5 (ref 4.5–8)
PROTEIN (URINE DIPSTICK): NEGATIVE MG/DL
SPECIFIC GRAVITY: 1.01 (ref 1–1.03)
UROBILINOGEN,SEMI-QN: 0.2 MG/DL (ref 0–1.9)

## 2022-11-21 PROCEDURE — 87086 URINE CULTURE/COLONY COUNT: CPT | Performed by: INTERNAL MEDICINE

## 2022-11-21 PROCEDURE — G0402 INITIAL PREVENTIVE EXAM: HCPCS | Performed by: INTERNAL MEDICINE

## 2022-11-21 PROCEDURE — 74176 CT ABD & PELVIS W/O CONTRAST: CPT | Performed by: INTERNAL MEDICINE

## 2022-11-21 PROCEDURE — 99213 OFFICE O/P EST LOW 20 MIN: CPT | Performed by: INTERNAL MEDICINE

## 2022-11-21 PROCEDURE — 81003 URINALYSIS AUTO W/O SCOPE: CPT | Performed by: INTERNAL MEDICINE

## 2022-11-21 NOTE — TELEPHONE ENCOUNTER
Called patient to offer 12:30pm appointment with LS today. Patient agreeable. Appointment confirmed.

## 2022-11-21 NOTE — TELEPHONE ENCOUNTER
Patient calling to speak to a nurse. She thinks she might be passing a kidney stone and would like to know what she should do next. She is having back pain, upset stomach since Saturday.

## 2022-11-21 NOTE — TELEPHONE ENCOUNTER
LS- patient thinks she is passing kidney stone. Call placed to patient for further triage. Please advise    Patient reports following:   -Onset of \"right lower back pain on Saturday (11/19), while in ER\". Described as \"dull, constant\" and rated as a \"5\". Has had an \"odor to [employer]". Took tylenol this morning which provided  \"some relief\"  -Denies blood in urine, pain with urination and/or nausea/vomitting currently or fever.  -Patient reports being under \"a lot of stress\" and was in ER (11/19- for \"palpitations\"). Recommended:   -Keep self well hydrated  -Continue Tylenol for pain relief   -Proceed to ER immediately if pain increases, notices blood in urine, develops fever and your additional symptoms develop. -Explained will discuss condition with LS for further recommendations.

## 2022-11-22 ENCOUNTER — TELEPHONE (OUTPATIENT)
Dept: INTERNAL MEDICINE CLINIC | Facility: CLINIC | Age: 67
End: 2022-11-22

## 2022-11-22 NOTE — TELEPHONE ENCOUNTER
Faxed CT Abdomen Pelvis from 11/21/2022 to Dr Marcia Horn at [facility] [phone] and called [phone] to notify nurse that it was faxed.

## 2022-12-20 ENCOUNTER — HOSPITAL ENCOUNTER (OUTPATIENT)
Dept: MAMMOGRAPHY | Age: 67
Discharge: HOME | End: 2022-12-20
Attending: INTERNAL MEDICINE
Payer: MEDICARE

## 2022-12-20 DIAGNOSIS — Z12.31 ENCOUNTER FOR SCREENING MAMMOGRAM FOR BREAST CANCER: ICD-10-CM

## 2022-12-20 PROCEDURE — 77067 SCR MAMMO BI INCL CAD: CPT | Performed by: INTERNAL MEDICINE

## 2022-12-20 PROCEDURE — 77063 BREAST TOMOSYNTHESIS BI: CPT | Performed by: INTERNAL MEDICINE

## 2023-08-07 ENCOUNTER — OFFICE VISIT (OUTPATIENT)
Dept: INTERNAL MEDICINE CLINIC | Facility: CLINIC | Age: 68
End: 2023-08-07
Payer: MEDICARE

## 2023-08-07 VITALS
DIASTOLIC BLOOD PRESSURE: 86 MMHG | RESPIRATION RATE: 16 BRPM | BODY MASS INDEX: 28 KG/M2 | HEIGHT: 66 IN | HEART RATE: 63 BPM | OXYGEN SATURATION: 98 % | SYSTOLIC BLOOD PRESSURE: 122 MMHG

## 2023-08-07 DIAGNOSIS — E04.2 MULTIPLE THYROID NODULES: ICD-10-CM

## 2023-08-07 DIAGNOSIS — E78.00 PURE HYPERCHOLESTEROLEMIA: Primary | ICD-10-CM

## 2023-08-07 DIAGNOSIS — N89.8 VAGINAL ITCHING: ICD-10-CM

## 2023-08-07 DIAGNOSIS — B37.2 CANDIDA INFECTION OF FLEXURAL SKIN: ICD-10-CM

## 2023-08-07 PROCEDURE — 87480 CANDIDA DNA DIR PROBE: CPT | Performed by: INTERNAL MEDICINE

## 2023-08-07 PROCEDURE — 87660 TRICHOMONAS VAGIN DIR PROBE: CPT | Performed by: INTERNAL MEDICINE

## 2023-08-07 PROCEDURE — 99213 OFFICE O/P EST LOW 20 MIN: CPT | Performed by: INTERNAL MEDICINE

## 2023-08-07 PROCEDURE — 87510 GARDNER VAG DNA DIR PROBE: CPT | Performed by: INTERNAL MEDICINE

## 2023-08-07 RX ORDER — NYSTATIN 100000 U/G
CREAM TOPICAL
Qty: 30 G | Refills: 0 | Status: SHIPPED | OUTPATIENT
Start: 2023-08-07

## 2023-08-07 NOTE — PATIENT INSTRUCTIONS
Blood work     Apply nystatin twice daily for 7 days to outer skin of groin and inner buttock area. Not for use inside anus or vagina.

## 2023-08-08 RX ORDER — METRONIDAZOLE 7.5 MG/G
1 GEL VAGINAL NIGHTLY
Qty: 70 G | Refills: 0 | Status: SHIPPED | OUTPATIENT
Start: 2023-08-08 | End: 2023-08-13

## 2023-09-05 ENCOUNTER — PATIENT MESSAGE (OUTPATIENT)
Dept: INTERNAL MEDICINE CLINIC | Facility: CLINIC | Age: 68
End: 2023-09-05

## 2023-09-05 NOTE — TELEPHONE ENCOUNTER
From: Fannie Macario  To: Dayana Gómez MD  Sent: 9/5/2023 12:59 PM CDT  Subject: Sinus infection     Dr. Jacquelin Forrest,  I came down with Covid on August 29. Finally got a Covid test and tested positive September 1st. We were visiting family and went out to dinner (we think our  was sick) and all 5 of us tested positive. Not knowing we were infected we continued our trip to [facility] where our achiness, low grade fever, post nasal drip and cough started. We are home now but I believe I have a sinus infection now as the fever, achiness and cough has gone away but I keep blowing chunks of green and slime out of my nose and that has been for the last 5-6 days. I have a headache with slight dizziness and still low energy I was wondering if you think a round of antibiotics might help clear it out of my sinuses. In the past when I have developed sinus infections, that usually is what gets me on the road to clearing that up. Is that something you could do. Look forward to hearing from you, Klaudia Espinoza.

## 2023-10-12 ENCOUNTER — TELEPHONE (OUTPATIENT)
Dept: INTERNAL MEDICINE CLINIC | Facility: CLINIC | Age: 68
End: 2023-10-12

## 2023-10-12 NOTE — TELEPHONE ENCOUNTER
[phone] spoke to pt, answered all of her vaccine and lab appointment questions to her full satisfaction. It was explained to the pt that she cannot get the Shingrix vaccine here with her Medicare part B. Pt v/u.

## 2023-10-13 ENCOUNTER — PATIENT MESSAGE (OUTPATIENT)
Dept: INTERNAL MEDICINE CLINIC | Facility: CLINIC | Age: 68
End: 2023-10-13

## 2023-10-16 ENCOUNTER — PATIENT MESSAGE (OUTPATIENT)
Dept: INTERNAL MEDICINE CLINIC | Facility: CLINIC | Age: 68
End: 2023-10-16

## 2023-10-16 ENCOUNTER — LAB ENCOUNTER (OUTPATIENT)
Dept: LAB | Age: 68
End: 2023-10-16
Attending: INTERNAL MEDICINE
Payer: MEDICARE

## 2023-10-16 DIAGNOSIS — E04.2 MULTIPLE THYROID NODULES: ICD-10-CM

## 2023-10-16 DIAGNOSIS — E78.00 PURE HYPERCHOLESTEROLEMIA: ICD-10-CM

## 2023-10-16 LAB
CHOLEST SERPL-MCNC: 189 MG/DL (ref ?–200)
FASTING PATIENT LIPID ANSWER: YES
HDLC SERPL-MCNC: 62 MG/DL (ref 40–59)
LDLC SERPL CALC-MCNC: 116 MG/DL (ref ?–100)
NONHDLC SERPL-MCNC: 127 MG/DL (ref ?–130)
TRIGL SERPL-MCNC: 58 MG/DL (ref 30–149)
TSI SER-ACNC: 1.27 MIU/ML (ref 0.36–3.74)
VLDLC SERPL CALC-MCNC: 10 MG/DL (ref 0–30)

## 2023-10-16 PROCEDURE — 36415 COLL VENOUS BLD VENIPUNCTURE: CPT

## 2023-10-16 PROCEDURE — 84443 ASSAY THYROID STIM HORMONE: CPT

## 2023-10-16 PROCEDURE — 80061 LIPID PANEL: CPT

## 2023-10-16 NOTE — TELEPHONE ENCOUNTER
From: Derik Wilkes  To: Jairon Martínez  Sent: 10/13/2023 5:29 PM CDT  Subject: Vaccine    Dear Dr. Elaine Morales,  Just wanted to let you know that I received my flu shot fluzone HE 65+ PF 0.7ML and my first shot of the shingrix 50MCG 0.5ml on 10-13-23. Please add this to my medical records. Thank you.

## 2023-11-20 ENCOUNTER — TELEPHONE (OUTPATIENT)
Dept: INTERNAL MEDICINE CLINIC | Facility: CLINIC | Age: 68
End: 2023-11-20

## 2023-11-20 DIAGNOSIS — Z12.31 ENCOUNTER FOR SCREENING MAMMOGRAM FOR BREAST CANCER: Primary | ICD-10-CM

## 2023-12-28 ENCOUNTER — HOSPITAL ENCOUNTER (OUTPATIENT)
Dept: MAMMOGRAPHY | Age: 68
Discharge: HOME | End: 2023-12-28
Attending: INTERNAL MEDICINE
Payer: MEDICARE

## 2023-12-28 DIAGNOSIS — Z12.31 ENCOUNTER FOR SCREENING MAMMOGRAM FOR BREAST CANCER: ICD-10-CM

## 2023-12-28 PROCEDURE — 77063 BREAST TOMOSYNTHESIS BI: CPT | Performed by: INTERNAL MEDICINE

## 2023-12-28 PROCEDURE — 77067 SCR MAMMO BI INCL CAD: CPT | Performed by: INTERNAL MEDICINE

## 2024-01-08 ENCOUNTER — PATIENT MESSAGE (OUTPATIENT)
Dept: INTERNAL MEDICINE CLINIC | Facility: CLINIC | Age: 69
End: 2024-01-08

## 2024-01-08 DIAGNOSIS — N89.8 VAGINAL DISCHARGE: Primary | ICD-10-CM

## 2024-01-08 RX ORDER — METRONIDAZOLE 7.5 MG/G
GEL VAGINAL
Qty: 70 G | Refills: 0 | OUTPATIENT
Start: 2024-01-08

## 2024-01-08 RX ORDER — METRONIDAZOLE 7.5 MG/G
1 GEL VAGINAL NIGHTLY
Qty: 70 G | Refills: 0 | Status: SHIPPED | OUTPATIENT
Start: 2024-01-08 | End: 2024-01-13

## 2024-01-08 RX ORDER — METRONIDAZOLE 7.5 MG/G
1 GEL VAGINAL NIGHTLY
Qty: 70 G | Refills: 0 | Status: SHIPPED | OUTPATIENT
Start: 2024-01-08 | End: 2024-01-08

## 2024-01-08 RX ORDER — NYSTATIN 100000 U/G
CREAM TOPICAL
Qty: 30 G | Refills: 0 | Status: SHIPPED | OUTPATIENT
Start: 2024-01-08

## 2024-01-08 NOTE — TELEPHONE ENCOUNTER
Patient is experiencing same vaginal sxs as she had in August  Bumps, itching, odor   No bleeding, no discharge    Patient did try to schedule appt via , no immediate openings.  She will be traveling to [place] and will not return to area until Mid-May.  Asks for refill for relief of vaginal sxs     Agreeable to see gyne after she returns in May

## 2024-01-25 ENCOUNTER — PATIENT OUTREACH (OUTPATIENT)
Dept: INTERNAL MEDICINE CLINIC | Facility: CLINIC | Age: 69
End: 2024-01-25

## 2024-03-27 ENCOUNTER — PATIENT OUTREACH (OUTPATIENT)
Dept: INTERNAL MEDICINE CLINIC | Facility: CLINIC | Age: 69
End: 2024-03-27

## 2024-06-09 ENCOUNTER — PATIENT MESSAGE (OUTPATIENT)
Dept: INTERNAL MEDICINE CLINIC | Facility: CLINIC | Age: 69
End: 2024-06-09

## 2024-06-09 ENCOUNTER — HOSPITAL ENCOUNTER (EMERGENCY)
Facility: HOSPITAL | Age: 69
Discharge: HOME | End: 2024-06-09
Attending: EMERGENCY MEDICINE
Payer: MEDICARE

## 2024-06-09 ENCOUNTER — APPOINTMENT (OUTPATIENT)
Dept: GENERAL RADIOLOGY | Facility: HOSPITAL | Age: 69
End: 2024-06-09
Payer: MEDICARE

## 2024-06-09 VITALS
BODY MASS INDEX: 27 KG/M2 | SYSTOLIC BLOOD PRESSURE: 155 MMHG | RESPIRATION RATE: 16 BRPM | TEMPERATURE: 98 F | DIASTOLIC BLOOD PRESSURE: 93 MMHG | HEART RATE: 72 BPM | WEIGHT: 169.75 LBS | OXYGEN SATURATION: 99 %

## 2024-06-09 DIAGNOSIS — S46.912A STRAIN OF LEFT SHOULDER, INITIAL ENCOUNTER: Primary | ICD-10-CM

## 2024-06-09 PROCEDURE — 87430 STREP A AG IA: CPT | Performed by: EMERGENCY MEDICINE

## 2024-06-09 PROCEDURE — 99284 EMERGENCY DEPT VISIT MOD MDM: CPT

## 2024-06-09 PROCEDURE — 99283 EMERGENCY DEPT VISIT LOW MDM: CPT

## 2024-06-09 PROCEDURE — 73030 X-RAY EXAM OF SHOULDER: CPT | Performed by: EMERGENCY MEDICINE

## 2024-06-09 NOTE — DISCHARGE INSTRUCTIONS
Wear sling for comfort as needed but you do not have to if you find it more annoying than helpful.  Tylenol or ibuprofen for pain.  Follow-up with orthopedics if pain persists.    Strep test is negative.
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
## 2024-06-10 NOTE — TELEPHONE ENCOUNTER
ED 6/9/2024  Clinical Impression:  1. Strain of left shoulder, initial encounter          Disposition:  Discharge  6/9/2024  4:57 pm     Follow-up:  Mirtha Luis MD  [address] [address] [phone]     Follow up        [facility]  [address] [place] [address] [phone]  Call  As needed

## 2024-07-03 ENCOUNTER — OFFICE VISIT (OUTPATIENT)
Dept: ORTHOPEDICS CLINIC | Facility: CLINIC | Age: 69
End: 2024-07-03
Payer: MEDICARE

## 2024-07-03 VITALS — HEIGHT: 66 IN | BODY MASS INDEX: 27.32 KG/M2 | WEIGHT: 170 LBS

## 2024-07-03 DIAGNOSIS — S83.206A POSITIVE MCMURRAY TEST OF RIGHT KNEE, INITIAL ENCOUNTER: ICD-10-CM

## 2024-07-03 DIAGNOSIS — S46.002A INJURY OF LEFT ROTATOR CUFF, INITIAL ENCOUNTER: Primary | ICD-10-CM

## 2024-07-03 PROCEDURE — 99204 OFFICE O/P NEW MOD 45 MIN: CPT | Performed by: ORTHOPAEDIC SURGERY
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
## 2024-07-26 ENCOUNTER — TELEPHONE (OUTPATIENT)
Facility: CLINIC | Age: 69
End: 2024-07-26

## 2024-07-26 DIAGNOSIS — S46.002A INJURY OF LEFT ROTATOR CUFF, INITIAL ENCOUNTER: ICD-10-CM

## 2024-07-26 DIAGNOSIS — S83.206A POSITIVE MCMURRAY TEST OF RIGHT KNEE, INITIAL ENCOUNTER: Primary | ICD-10-CM

## 2024-07-26 NOTE — TELEPHONE ENCOUNTER
Patient called stating that ultrasounds at [facility] are booked out till October and she is going on a trip in the middle of september and would like to have it before then. Please advise where patient can go. She would like a call back

## 2024-08-02 ENCOUNTER — TELEPHONE (OUTPATIENT)
Dept: ORTHOPEDICS CLINIC | Facility: CLINIC | Age: 69
End: 2024-08-02

## 2024-08-02 ENCOUNTER — OFFICE VISIT (OUTPATIENT)
Dept: ORTHOPEDICS CLINIC | Facility: CLINIC | Age: 69
End: 2024-08-02
Payer: MEDICARE

## 2024-08-02 DIAGNOSIS — S83.231A COMPLEX TEAR OF MEDIAL MENISCUS OF RIGHT KNEE AS CURRENT INJURY, INITIAL ENCOUNTER: Primary | ICD-10-CM

## 2024-08-02 DIAGNOSIS — M65.9 SYNOVITIS OF KNEE: ICD-10-CM

## 2024-08-02 PROCEDURE — G2212 PROLONG OUTPT/OFFICE VIS: HCPCS | Performed by: ORTHOPAEDIC SURGERY

## 2024-08-02 PROCEDURE — 99215 OFFICE O/P EST HI 40 MIN: CPT | Performed by: ORTHOPAEDIC SURGERY

## 2024-08-02 NOTE — PROGRESS NOTES
OR BOOKING SHEET KNEE ARTHROSCOPY  Location: [] [facility]   [x] [facility]  Name: Minerva Wilkes  MRN: [id number]   : 1955  Diagnosis:  [x] Complex tear of medial meniscus of right knee as current injury, initial encounter [S83.719A]  Disposition:    [x] Ambulatory  Operative Time Required: 45 minutes ([facility])  Procedure:  Antibiotics: 2 g cefazolin within 60 minutes of surgical incision (3 g if > 120 kg)  Laterality: [x] RIGHT  [] LEFT                       [] BILATERAL  Procedures:   [x] Knee Arthroscopy     [x] Partial Medial Meniscectomy (58740)   [] Partial Lateral Meniscectomy (99394)                    [] Partial Medial and Lateral Meniscectomy (20918)     [] Lateral Meniscus Repair (84277)                    [] Medial Meniscus Repair (31270)     [x] Synovectomy (07884)   [] Abrasionplasty (68401)     [] Partial Medial Meniscectomy vs Medial Meniscus Repair (28561 vs 84948)   [] Partial Lateral Meniscectomy vs Lateral Meniscus Repair (45039 vs 62701)   [] Irrigation & Debridement (21402)   [] Manipulation Under Anesthesia (63817)   [] Chondroplasty (34352)   [] Removal of Loose Body (99813)    Injections:   [] Stem cells from bone marrow aspiration (98404)    From:  [] Left Iliac crest  [] Right Iliac crest    To:  [] Left knee  [] Right knee  [] Other     Additional info:   [x] PCP Clearance Needed  [] MRSA  [x] Physical Therapy [facility]  [] DME Rx  [] Appt with Dr. Warren needed  Implants needed: None  Positioning Equipment: Supine with Lateral Post
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
## 2024-08-02 NOTE — TELEPHONE ENCOUNTER
Date of Surgery: [facility] 2024       Post Op Appt:  2024 1130am    Case ID: [id number]     Notes:             OR BOOKING SHEET KNEE ARTHROSCOPY  Location: [] [facility]                    [x] [facility]  Name: Minerva Wilkes  MRN: [id number]   : 1955  Diagnosis:  [x] Complex tear of medial meniscus of right knee as current injury, initial encounter [A43.103A]  Disposition:    [x] Ambulatory  Operative Time Required: 45 minutes ([facility])  Procedure:  Antibiotics: 2 g cefazolin within 60 minutes of surgical incision (3 g if > 120 kg)  Laterality:                  [x] RIGHT                  [] LEFT                          [] BILATERAL  Procedures:                    [x] Knee Arthroscopy                                                   [x] Partial Medial Meniscectomy (63509)                    [] Partial Lateral Meniscectomy (77704)                    [] Partial Medial and Lateral Meniscectomy (01743)                       [] Lateral Meniscus Repair (91898)                    [] Medial Meniscus Repair (64398)                       [x] Synovectomy (14457)                    [] Abrasionplasty (92155)                       [] Partial Medial Meniscectomy vs Medial Meniscus Repair (38982 vs 52401)                    [] Partial Lateral Meniscectomy vs Lateral Meniscus Repair (45356 vs 30602)                    [] Irrigation & Debridement (03386)                    [] Manipulation Under Anesthesia (16518)                    [] Chondroplasty (77412)                    [] Removal of Loose Body (22620)     Injections:                    [] Stem cells from bone marrow aspiration (34115)                                      From:                   [] Left Iliac crest                   [] Right Iliac crest                                      To:                   [] Left knee         [] Right knee                          [] Other      Additional info:   [x] PCP Clearance Needed  [] MRSA  [x] [facility]  [] DME Rx  [] Appt with Dr. Warren needed  Implants needed: None  Positioning Equipment: Supine with Lateral Post
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
## 2024-08-06 ENCOUNTER — OFFICE VISIT (OUTPATIENT)
Dept: INTERNAL MEDICINE CLINIC | Facility: CLINIC | Age: 69
End: 2024-08-06
Payer: MEDICARE

## 2024-08-06 VITALS
OXYGEN SATURATION: 97 % | HEART RATE: 68 BPM | SYSTOLIC BLOOD PRESSURE: 130 MMHG | RESPIRATION RATE: 16 BRPM | HEIGHT: 66 IN | WEIGHT: 179 LBS | DIASTOLIC BLOOD PRESSURE: 98 MMHG | BODY MASS INDEX: 28.77 KG/M2

## 2024-08-06 DIAGNOSIS — Z01.818 PREOP GENERAL PHYSICAL EXAM: Primary | ICD-10-CM

## 2024-08-06 PROCEDURE — 99214 OFFICE O/P EST MOD 30 MIN: CPT | Performed by: INTERNAL MEDICINE

## 2024-08-06 NOTE — TELEPHONE ENCOUNTER
Pt called in to ask if Dr. Warren has an specific ice pack he recommends for a shoulder and knee.

## 2024-08-06 NOTE — PROGRESS NOTES
PREOPERATIVE EVALUATION:     Minerva Wilkes is a 68 year old female who presents for a pre-operative physical exam.   Minerva Wilkes is scheduled for   - a right knee arthroscopy  procedure at [facility] on 8/8/ 24 performed by Dr Nayak Indication: Meniscus Tear  - a left shoulder arthroscopy  procedure at [facility] on August last week  performed by Dr Nayak Indication: Rotator cuff tear        HPI related to surgery:     The patient is a 68-year-old female who had a fall few weeks ago leading to injury to the right knee and left shoulder.  There is a plan to get right knee arthroscopy and left shoulder arthroscopy by Dr. Nayak.     The patient is physically active and prior to this event she would easily hike 3 miles.    No history of cardiorespiratory issues.  Patient had remote history of appendicular cancer for which she gets CT scan of the abdomen every year.    She  has had previous anesthesia:  Yes.  Previous complications:  No    Social History     Socioeconomic History   • Marital status:    Occupational History   • Occupation: artist, watercolors   Tobacco Use   • Smoking status: Never   • Smokeless tobacco: Never   Vaping Use   • Vaping status: Never Used   Substance and Sexual Activity   • Alcohol use: No     Alcohol/week: 0.0 standard drinks of alcohol   • Drug use: No   Other Topics Concern   • Caffeine Concern Yes     Comment: tea    • Exercise Yes     Comment: 2 days per week      Social Determinants of Health     Financial Resource Strain: Low Risk  (5/4/2023)    Received from [facility]    Overall Financial Resource Strain (CARDIA)    • Difficulty of Paying Living Expenses: Not hard at all   Food Insecurity: No Food Insecurity (5/4/2023)    Received from [facility]    Hunger Vital Sign    • Worried About Running Out of Food in the Last Year: Never true    • Ran Out of Food in the Last Year: Never true   Transportation Needs: No Transportation Needs  (5/4/2023)    Received from [facility]    PRAPARE - Transportation    • Lack of Transportation (Medical): No    • Lack of Transportation (Non-Medical): No   Physical Activity: Sufficiently Active (5/4/2023)    Received from [facility]    Exercise Vital Sign    • Days of Exercise per Week: 5 days    • Minutes of Exercise per Session: 30 min   Stress: No Stress Concern Present (5/4/2023)    Received from [facility]    [place] [employer] - Occupational Stress Questionnaire    • Feeling of Stress : Only a little   Social Connections: Socially Integrated (5/4/2023)    Received from [facility]    Social Connection and Isolation Panel [NHANES]    • Frequency of Communication with Friends and Family: More than three times a week    • Frequency of Social Gatherings with Friends and Family: More than three times a week    • Attends Christianity Services: 1 to 4 times per year    • Active Member of Clubs or Organizations: Yes    • Attends Club or Organization Meetings: 1 to 4 times per year    • Marital Status:    Housing Stability: Low Risk  (5/4/2023)    Received from [facility]    Housing Stability Vital Sign    • Unable to Pay for Housing in the Last Year: No    • Number of Places Lived in the Last Year: 2    • Unstable Housing in the Last Year: No      Past Medical History:   • Abdominal distention   • Abdominal hernia   • Abdominal pain   • Amenorrhea   • Back pain    If I lift wrong   • Belching   • Bloating   • Calculus of kidney   • Cancer (HCC)   • Chronic rhinitis   • Chronic rhinosinusitis   • [place] bullosa    bilat small to moderate sized   • Derangement of left knee    with pain,swelling,difficulty kneeling, difficulty going up and down stairs   • Diarrhea, unspecified   • Dysuria   • Fatigue   • Fibrocystic breast disease   • Flatulence/gas pain/belching   • Food intolerance   • Heartburn   • Hemorrhoids   •  Hydronephrosis   • Hypertrophy of both inferior nasal turbinates    L>R   • Hypoglycemia   • Migraines   • Miscarriage (HCC)    s/p D & C   • Multinodular goiter   • Nasal septal deviation    significant,right sided   • Pharyngitis   • Sinus mucosal thickening    bilat, of maxillary sinus floors and scant bilateral anterior ethmoidal air cell mucosal thickening   • Stress incontinence   • Thyroid nodule    s/p FNA, negative   • URI (upper respiratory infection)    mild bronchitis   • Varicose veins    symptomatic   • Vomiting     Past Surgical History:   Procedure Laterality Date   • Appendectomy     • Appendectomy  11/18   • Cholecystectomy  12/18   • Colonoscopy  6/2/16    repeat 10 yrs- colon polyps   • Cyst aspiration left Left     benign   • Cyst aspiration right Right     benign   • D & c  1984   • Femur/knee surg unlisted      left knee arthroscopy   • Fna (indicate source below)      thyroid nodule x1 negative   • Hipec specimen to pathology     • Hysterectomy     • Oophorectomy     • Other surgical history  12/18/2018    Cystoscopy, Stent Placement @ [facility]   • Sigmoidoscopy,diagnostic  8/19   • Total abdom hysterectomy  12/19     Allergies:   Allergies   Allergen Reactions   • Corn Syrup [Glucose] OTHER (SEE COMMENTS)     Migraine, congestion   • Dairy Products HIVES   • Corn HIVES   • Cornstarch OTHER (SEE COMMENTS)     MIGRAINE   • Eggs Or Egg-Derived Products HIVES   • Erythromycin    • Gluten Flour    • Gluten Meal OTHER (SEE COMMENTS)   • Nuts HIVES     Current Outpatient Medications   Medication Sig Dispense Refill   • CALCIUM OR Take by mouth daily.       • Omega-3 Fatty Acids (FISH OIL OR) Take 1,280 mg by mouth daily.       • Multiple Vitamin (MULTI-VITAMIN DAILY OR) Take by mouth daily.       • S-Adenosylmethionine (DANYEL-E) 400 MG Oral Tab Take 1 tablet by mouth daily.     • LUTEIN OR Take 1 tablet by mouth daily.          REVIEW OF SYSTEMS:     Review of system is negative for cardio ,  respiratory,  GI ,  systems    Right knee and left shoulder pain as mentioned above.    PHYSICAL EXAM:   BP (!) 130/98 (BP Location: Left arm, Patient Position: Sitting, Cuff Size: adult)   Pulse 68   Resp 16   Ht 5' 6\" (1.676 m)   Wt 179 lb (81.2 kg)   SpO2 97%   BMI 28.89 kg/m²      Physical Exam  Constitutional:       Appearance: She is normal weight.   HENT:      Head: Normocephalic.      Mouth/Throat:      Mouth: Mucous membranes are moist.   Cardiovascular:      Rate and Rhythm: Normal rate and regular rhythm.      Pulses: Normal pulses.      Heart sounds: Normal heart sounds. No murmur heard.  Pulmonary:      Effort: Pulmonary effort is normal.      Breath sounds: Normal breath sounds.   Abdominal:      General: Abdomen is flat. Bowel sounds are normal.      Palpations: Abdomen is soft.   Skin:     General: Skin is warm.   Neurological:      General: No focal deficit present.      Mental Status: She is alert.   Psychiatric:         Mood and Affect: Mood normal.        LABORATORY DATA:          ASSESSMENT AND PLAN:   Minerva Wilkes has no significant history of cardiac or pulmonary conditions.   She is a good surgical candidate. She can undergo arthroscopic procedures. This consult was sent back the referring physician, Dr. Nayak.    Assessment:  Encounter Diagnosis   Name Primary?   • Preop general physical exam Yes         Plan   No orders of the defined types were placed in this encounter.        Musa Dickerson MD     Statement Selected

## 2024-08-07 ENCOUNTER — TELEPHONE (OUTPATIENT)
Dept: INTERNAL MEDICINE CLINIC | Facility: CLINIC | Age: 69
End: 2024-08-07

## 2024-08-07 DIAGNOSIS — Z98.890 S/P ARTHROSCOPY OF KNEE: Primary | ICD-10-CM

## 2024-08-07 RX ORDER — MELOXICAM 15 MG/1
15 TABLET ORAL DAILY
Qty: 14 TABLET | Refills: 1 | Status: SHIPPED | OUTPATIENT
Start: 2024-08-07

## 2024-08-07 RX ORDER — ONDANSETRON 4 MG/1
TABLET, FILM COATED ORAL
Qty: 8 TABLET | Refills: 0 | Status: SHIPPED | OUTPATIENT
Start: 2024-08-07

## 2024-08-07 RX ORDER — TRAMADOL HYDROCHLORIDE 50 MG/1
TABLET ORAL
Qty: 20 TABLET | Refills: 1 | Status: SHIPPED | OUTPATIENT
Start: 2024-08-07

## 2024-08-07 NOTE — TELEPHONE ENCOUNTER
J[facility] Warren office  Ph: [phone]  Fax: [phone]    Requesting pre-op clearance from 8/6 [facility] to be sent over once available.

## 2024-08-07 NOTE — TELEPHONE ENCOUNTER
Spoke with TJ via phone. He stated he did clear pt for surgery yesterday, and will sign OV note later today. Alternate provider was able to print off OV note for surgical clearance.   Faxed clearance to Jaret @ Dr. Warren's office.  S/w Jaret otp, he stated he received the fax and can see pt in now cleared for surgery. He stated nothing more is needed at this time.

## 2024-08-07 NOTE — TELEPHONE ENCOUNTER
Jaret Warren office  Ph: [phone]  Fax: [phone]     Per pcp MA, the OV for 08/06 needs a signature, is there a way we can contact provider to sign and fax over pre op clearance for pt's apt tomorrow?     Please advise.

## 2024-08-14 ENCOUNTER — PATIENT MESSAGE (OUTPATIENT)
Dept: ORTHOPEDICS CLINIC | Facility: CLINIC | Age: 69
End: 2024-08-14

## 2024-08-14 ENCOUNTER — TELEPHONE (OUTPATIENT)
Dept: ORTHOPEDICS CLINIC | Facility: CLINIC | Age: 69
End: 2024-08-14

## 2024-08-14 NOTE — TELEPHONE ENCOUNTER
Can she take ibuprofen liquid gels 200mg for knee swelling due to meloxicam allergy?  How much daily?  Please advise    DOS 8/8/24 Right knee arthroscopy     Spoke with patient who stated she was aware that the meloxicam contained corn starch when she filled the prescription at the pharmacy.  She was able to take it for a little bit and it was helping the swelling, but now she is feeling her allergy .  She is able to take Ibuprofen liquid gels 200mg, but the information says not to take ibuprofen because it delays healing.

## 2024-08-14 NOTE — TELEPHONE ENCOUNTER
Patient had surgery 8/8 for right knee, she was prescribed Meloxicam but was having an allergic reaction to it. Wants to know if there is anything else she can be prescribed for swelling. Please advise

## 2024-08-15 NOTE — TELEPHONE ENCOUNTER
Jennifer Moctezuma PA   to [facility]        8/15/24  2:18 PM  That's fine if she tolerates

## 2024-08-16 ENCOUNTER — OFFICE VISIT (OUTPATIENT)
Dept: ORTHOPEDICS CLINIC | Facility: CLINIC | Age: 69
End: 2024-08-16
Payer: MEDICARE

## 2024-08-16 ENCOUNTER — TELEPHONE (OUTPATIENT)
Dept: ORTHOPEDICS CLINIC | Facility: CLINIC | Age: 69
End: 2024-08-16

## 2024-08-16 VITALS — HEIGHT: 66 IN | BODY MASS INDEX: 27.32 KG/M2 | WEIGHT: 170 LBS

## 2024-08-16 DIAGNOSIS — Z98.890 S/P ARTHROSCOPY OF KNEE: Primary | ICD-10-CM

## 2024-08-16 DIAGNOSIS — M75.22 BICEPS TENDINITIS OF LEFT UPPER EXTREMITY: ICD-10-CM

## 2024-08-16 DIAGNOSIS — M75.42 SUBACROMIAL IMPINGEMENT OF LEFT SHOULDER: ICD-10-CM

## 2024-08-16 DIAGNOSIS — S46.012A TRAUMATIC COMPLETE TEAR OF LEFT ROTATOR CUFF, INITIAL ENCOUNTER: Primary | ICD-10-CM

## 2024-08-16 PROCEDURE — 99024 POSTOP FOLLOW-UP VISIT: CPT | Performed by: PHYSICIAN ASSISTANT

## 2024-08-16 NOTE — PROGRESS NOTES
[facility]  [address] [place] [id number]  [phone]       Name: Minerva Wilkes   MRN: [id number]  Date: 8/16/2024     REASON FOR VISIT: First Post-Surgical Visit   Surgery: Right lateral meniscectomy, debridement on August 08, 2024.     INTERVAL HISTORY:  Minerva Wilkes is a 68 year old female who returns after the aforementioned procedure.  The post-operative course has been unremarkable with pain well controlled and overall progress noted.     Physical therapy was started and is progressing well.  The patient denies any calf pain or tenderness, fevers, chills, sweats or signs of active infection. The patient has been compliant with the postoperative protocol, and admits to normal bowel and bladder function. No acute issues.     ROS: ROS    PE:   There were no vitals filed for this visit.  Estimated body mass index is 28.89 kg/m² as calculated from the following:    Height as of 8/6/24: 5' 6\" (1.676 m).    Weight as of 8/6/24: 179 lb (81.2 kg).    Physical Exam  Constitutional:       Appearance: Normal appearance.   HENT:      Head: Normocephalic and atraumatic.   Eyes:      Extraocular Movements: Extraocular movements intact.   Neck:      Musculoskeletal: Normal range of motion and neck supple.   Cardiovascular:      Pulses: Normal pulses.   Pulmonary:      Effort: Pulmonary effort is normal. No respiratory distress.   Abdominal:      General: There is no distension.   Skin:     General: Skin is warm.      Capillary Refill: Capillary refill takes less than 2 seconds.      Findings: No bruising.   Neurological:      General: No focal deficit present.      Mental Status: She is alert.   Psychiatric:         Mood and Affect: Mood normal.     Examination of the right knee demonstrates:     Physical examination the patient is alert and oriented x3, well-developed, well-nourished, no acute distress.     Tegaderm dressings were removed, and Steri-Strips were  maintained and kept in place.  Small effusion.     The patient achieves full and symmetric knee ROM.     Incisional sites are clean dry intact without signs of active pathology.  Calf is soft and nontender to palpation.    The contralateral knee is without limitation in range of motion or strength, no positive provocative maneuvers.       IMPRESSION: Minerva Wilkes is a 68 year old female who presents 6 days s/p right lateral meniscectomy, debridement on August 08, 2024.     PLAN:   We had a lengthy discussion with the patient regarding the patient's findings consistent with the expected postoperative course. We recommend continuation of physical therapy with rehabilitation efforts focused on strengthening, range of motion, functional ability, and return to baseline activity. The patient can continue to progress per protocol.    All questions were answered appropriately and the patient was in agreement with the treatment plan.       FOLLOW-UP:  Return to clinic in four weeks. No imaging required at next visit.             Jennifer Moctezuma [facility], PA-C Orthopedic Surgery / Sports Medicine Specialist  [facility]  [address] [place] [address] [id number] [place] [address].org  [email or web address]  t: [phone]  o: [phone]  f: [phone]    This note was dictated using Dragon software.  While it was briefly proofread prior to completion, some grammatical, spelling, and word choice errors due to dictation may still occur.

## 2024-08-16 NOTE — TELEPHONE ENCOUNTER
Date of Surgery:    [facility] 2024    Post Op Appt:  2024 1130AM    Case ID: [id number]     Notes: Lets please add for . Thanks!     PATIENT NEEDS TO BE FIRST SURGICAL PATIENT DUE TO Hypoglycemia        OR BOOKING SHEET SHOULDER  Location: [] [facility]                    [x] [facility]  Name: Minerva Wilkes  MRN: [id number]   : 1955  Diagnos  [x] Traumatic complete tear of left rotator cuff, initial encounter [S46.012A]  Disposition:    [x] Ambulatory  [] Overnight for DON  [] Overnight for observation and pain control  [] Inpatient procedure     Operative Time Required:  2 hours ([facility])   Antibiotics: 2 g cefazolin within 60 minutes of surgical incision  Procedure:   Laterality:                  [] RIGHT                  [x] LEFT                   [] BILATERAL  Procedures:                    [x] Shoulder Arthroscopy                                 [x] Rotator Cuff Repair (79737)  [x] Arthroscopic Biceps Tenodesis (89686)  [x] Subacromial Decompression (20029)  [] Distal Clavicle Excision (44539)  [] Extensive Debridement (03195)     [] SLAP repair (17961)  [] Capsulorraphy / Labrum Repair (77054)     Additional info:   [] PCP Clearance Needed  [] MRSA  [] C-Arm  [x] TXA at time surgery  [x] Physical Therapy External [facility]  [x] DME Rx Needed  [] Appt with Dr. Warren needed  Implants needed: Arthrex, Dalila  Positioning Equipment: Beach Chair Setup, [place]

## 2024-08-16 NOTE — PROGRESS NOTES
OR BOOKING SHEET SHOULDER  Location: [] [facility]   [x] [facility]  Name: Minerva Wilkes  MRN: [id number]   : 1955  Diagnos  [x] Traumatic complete tear of left rotator cuff, initial encounter [S46.012A]  Disposition:    [x] Ambulatory  [] Overnight for DON  [] Overnight for observation and pain control  [] Inpatient procedure    Operative Time Required:  2 hours ([facility])   Antibiotics: 2 g cefazolin within 60 minutes of surgical incision  Procedure:   Laterality: [] RIGHT [x] LEFT                  [] BILATERAL  Procedures:   [x] Shoulder Arthroscopy    [x] Rotator Cuff Repair (32311)  [x] Arthroscopic Biceps Tenodesis (26514)  [x] Subacromial Decompression (09371)  [] Distal Clavicle Excision (26500)  [] Extensive Debridement (99324)    [] SLAP repair (10605)  [] Capsulorraphy / Labrum Repair (18380)    Additional info:   [] PCP Clearance Needed  [] MRSA  [] C-Arm  [x] TXA at time surgery  [x] Physical Therapy External [facility]  [x] DME Rx Needed  [] Appt with Dr. Warren needed  Implants needed: Arthrex, Dalila  Positioning Equipment: Beach Chair Setup, Todd
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
## 2024-08-26 ENCOUNTER — MED REC SCAN ONLY (OUTPATIENT)
Dept: ORTHOPEDICS CLINIC | Facility: CLINIC | Age: 69
End: 2024-08-26

## 2024-08-28 DIAGNOSIS — Z98.890 S/P ARTHROSCOPY OF SHOULDER: Primary | ICD-10-CM

## 2024-08-28 RX ORDER — ONDANSETRON 4 MG/1
TABLET, FILM COATED ORAL
Qty: 8 TABLET | Refills: 0 | Status: SHIPPED | OUTPATIENT
Start: 2024-08-28

## 2024-08-28 RX ORDER — TRAMADOL HYDROCHLORIDE 50 MG/1
TABLET ORAL
Qty: 20 TABLET | Refills: 1 | Status: SHIPPED | OUTPATIENT
Start: 2024-08-28

## 2024-09-06 ENCOUNTER — OFFICE VISIT (OUTPATIENT)
Dept: ORTHOPEDICS CLINIC | Facility: CLINIC | Age: 69
End: 2024-09-06
Payer: MEDICARE

## 2024-09-06 DIAGNOSIS — Z98.890 S/P ARTHROSCOPY OF SHOULDER: Primary | ICD-10-CM

## 2024-09-06 PROCEDURE — 99024 POSTOP FOLLOW-UP VISIT: CPT | Performed by: PHYSICIAN ASSISTANT

## 2024-09-06 NOTE — PROGRESS NOTES
[facility] ORTHOPEDICS  [address] [place] [address]  [phone]       Name: Minerva Wilkes   MRN: [id number]  Date: 9/6/2024     REASON FOR VISIT: First Post-Surgical Visit   Surgery: Left rotator cuff repair, biceps tenodesis, subacromial decompression, and extensive debridement on 08/29/2024.    INTERVAL HISTORY:  Minerva Wilkes is a 68 year old female who returns after the aforementioned procedure.  The post-operative course has been unremarkable with pain well controlled and overall progress noted.     Physical therapy was started and is progressing well.  The patient denies any calf pain or tenderness, fevers, chills, sweats or signs of active infection. The patient has been compliant with the postoperative protocol, and admits to normal bowel and bladder function. No acute issues. Working with [facility].     ROS: ROS    PE:   There were no vitals filed for this visit.  Estimated body mass index is 27.44 kg/m² as calculated from the following:    Height as of 8/19/24: 5' 6\" (1.676 m).    Weight as of 8/19/24: 170 lb (77.1 kg).    Physical Exam  Constitutional:       Appearance: Normal appearance.   HENT:      Head: Normocephalic and atraumatic.   Eyes:      Extraocular Movements: Extraocular movements intact.   Neck:      Musculoskeletal: Normal range of motion and neck supple.   Cardiovascular:      Pulses: Normal pulses.   Pulmonary:      Effort: Pulmonary effort is normal. No respiratory distress.   Abdominal:      General: There is no distension.   Skin:     General: Skin is warm.      Capillary Refill: Capillary refill takes less than 2 seconds.      Findings: No bruising.   Neurological:      General: No focal deficit present.      Mental Status: She is alert.   Psychiatric:         Mood and Affect: Mood normal.     Examination of the left shoulder demonstrates:     Physical examination the patient is alert and oriented x3, well-developed,  well-nourished, no acute distress.     Tegaderm dressings were removed, and Steri-Strips were maintained and kept in place.    Incisional sites are clean dry intact without signs of active pathology.      The contralateral shoulder is without limitation in range of motion or strength, no positive provocative maneuvers.     IMPRESSION: Minerva Wilkes is a 68 year old female who presents 8 days s/p Left rotator cuff repair, biceps tenodesis, subacromial decompression, and extensive debridement on 08/29/2024.    PLAN:   We had a lengthy discussion with the patient regarding the patient's findings consistent with the expected postoperative course. We recommend continuation of physical therapy with rehabilitation efforts focused on strengthening, range of motion, functional ability, and return to baseline activity. The patient can continue to progress per protocol.    All questions were answered appropriately and the patient was in agreement with the treatment plan.       FOLLOW-UP:  Return to clinic in four weeks. No imaging required at next visit.             Jennifer Moctezuma [facility], PA-C Orthopedic Surgery / Sports Medicine Specialist  [facility] Orthopaedic Surgery  [address] [place] [address] [id number] [place] [email or web address]  [email or web address]  t: [phone]  o: [phone]  f: [phone]    This note was dictated using Dragon software.  While it was briefly proofread prior to completion, some grammatical, spelling, and word choice errors due to dictation may still occur.

## 2024-09-30 ENCOUNTER — OFFICE VISIT (OUTPATIENT)
Dept: ORTHOPEDICS CLINIC | Facility: CLINIC | Age: 69
End: 2024-09-30
Payer: MEDICARE

## 2024-09-30 DIAGNOSIS — Z98.890 S/P ARTHROSCOPY OF SHOULDER: Primary | ICD-10-CM

## 2024-09-30 PROCEDURE — 99024 POSTOP FOLLOW-UP VISIT: CPT | Performed by: PHYSICIAN ASSISTANT

## 2024-09-30 NOTE — PROGRESS NOTES
[facility] ORTHOPEDICS  [address] [place] [address]  [phone]       Name: Minerva Wilkes   MRN: [id number]  Date: 9/30/2024     REASON FOR VISIT: Second Post-Surgical Visit   Surgery:  Left rotator cuff repair, biceps tenodesis, subacromial decompression, and extensive debridement on 08/29/2024.       INTERVAL HISTORY:  Minerva Wilkes is a 68 year old female who returns after the aforementioned procedure.  The post-operative course has been unremarkable with pain well controlled and overall progress noted.     Physical therapy was started and is progressing well.  She notes great improvement. Working with [facility] at [facility].     ROS: ROS    PE:   There were no vitals filed for this visit.  Estimated body mass index is 27.44 kg/m² as calculated from the following:    Height as of 8/19/24: 5' 6\" (1.676 m).    Weight as of 8/19/24: 170 lb (77.1 kg).    Physical Exam  Constitutional:       Appearance: Normal appearance.   HENT:      Head: Normocephalic and atraumatic.   Eyes:      Extraocular Movements: Extraocular movements intact.   Neck:      Musculoskeletal: Normal range of motion and neck supple.   Cardiovascular:      Pulses: Normal pulses.   Pulmonary:      Effort: Pulmonary effort is normal. No respiratory distress.   Abdominal:      General: There is no distension.   Skin:     General: Skin is warm.      Capillary Refill: Capillary refill takes less than 2 seconds.      Findings: No bruising.   Neurological:      General: No focal deficit present.      Mental Status: She is alert.   Psychiatric:         Mood and Affect: Mood normal.     Examination of the left shoulder demonstrates:     Physical examination the patient is alert and oriented x3, well-developed, well-nourished, no acute distress.     145 of forward elevation, 45 of ER, and IR T12.     Incisional sites are clean dry intact without signs of active pathology.      The contralateral shoulder is  without limitation in range of motion or strength, no positive provocative maneuvers.     IMPRESSION: Minerva Wilkes is a 68 year old female who presents 4 weeks s/p  Left rotator cuff repair, biceps tenodesis, subacromial decompression, and extensive debridement on 08/29/2024.       PLAN:   We had a lengthy discussion with the patient regarding the patient's findings consistent with the expected postoperative course. We recommend continuation of physical therapy with rehabilitation efforts focused on strengthening, range of motion, functional ability, and return to baseline activity. The patient can continue to progress per protocol.    All questions were answered appropriately and the patient was in agreement with the treatment plan.       FOLLOW-UP:  Return to clinic in eight weeks. No imaging required at next visit.             Sincer BELLE Moctezuma, [facility], PA-C Orthopedic Surgery / Sports Medicine Specialist  [facility] Orthopaedic Surgery  [address] [place] [email or web address]  [email or web address]  t: [phone]  o: [phone]  f: [phone]    This note was dictated using Dragon software.  While it was briefly proofread prior to completion, some grammatical, spelling, and word choice errors due to dictation may still occur.

## 2024-10-23 ENCOUNTER — TELEPHONE (OUTPATIENT)
Dept: ORTHOPEDICS CLINIC | Facility: CLINIC | Age: 69
End: 2024-10-23

## 2024-10-23 NOTE — TELEPHONE ENCOUNTER
Please help patient set up a sooner follow up appointment to further address the knee pain.     Future Appointments   Date Time Provider Department Center   10/25/2024  9:00 AM Mirtha Luis MD [facility] [place]   12/2/2024  9:00 AM [id number]

## 2024-10-23 NOTE — TELEPHONE ENCOUNTER
Patient had surgery on her right knee 8/8 she is still having some swelling and would like to get an injection. Would like a call back to discuss what type of injection she should/can get. Please advise  [phone]

## 2024-10-25 ENCOUNTER — OFFICE VISIT (OUTPATIENT)
Dept: INTERNAL MEDICINE CLINIC | Facility: CLINIC | Age: 69
End: 2024-10-25
Payer: MEDICARE

## 2024-10-25 VITALS
DIASTOLIC BLOOD PRESSURE: 86 MMHG | RESPIRATION RATE: 16 BRPM | BODY MASS INDEX: 29.69 KG/M2 | HEART RATE: 76 BPM | OXYGEN SATURATION: 96 % | WEIGHT: 178.19 LBS | SYSTOLIC BLOOD PRESSURE: 120 MMHG | HEIGHT: 65 IN | TEMPERATURE: 98 F

## 2024-10-25 DIAGNOSIS — Z00.00 WELLNESS EXAMINATION: Primary | ICD-10-CM

## 2024-10-25 DIAGNOSIS — R79.89 ABNORMAL CBC: ICD-10-CM

## 2024-10-25 DIAGNOSIS — Z12.31 SCREENING MAMMOGRAM FOR BREAST CANCER: ICD-10-CM

## 2024-10-25 DIAGNOSIS — Z85.09 HISTORY OF MALIGNANT NEOPLASM OF APPENDIX: ICD-10-CM

## 2024-10-25 DIAGNOSIS — E78.00 PURE HYPERCHOLESTEROLEMIA: ICD-10-CM

## 2024-10-25 DIAGNOSIS — Z90.81 HISTORY OF SPLENECTOMY: ICD-10-CM

## 2024-10-25 DIAGNOSIS — E04.2 MULTIPLE THYROID NODULES: ICD-10-CM

## 2024-10-25 NOTE — PATIENT INSTRUCTIONS
Due for Tdap tetanus/pertussis vaccine    [id number] pneumonia vaccine    Meningitis vaccine is due

## 2024-10-25 NOTE — TELEPHONE ENCOUNTER
Future Appointments   Date Time Provider Department Center   10/28/2024  1:40 PM Allie Warren MD [id number]   12/2/2024  9:00 AM Jennifer Moctezuma PA [id number]

## 2024-10-25 NOTE — PROGRESS NOTES
Subjective:   Minerva Wilkes is a 68 year old female who presents for a Initial Annual Wellness Visit (outside the first 12 months of Medicare eligibility, no prior AWV) and scheduled follow up of multiple significant but stable problems.     She is in rehab for her shoulder and her knee     She declines DEXA scan     S/p splenectomy. 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 Followed by Dr. Kd Sims at [facility] and has yearly scans      History/Other:   Fall Risk Assessment:   She has been screened for Falls and is low risk.      Cognitive Assessment:   She had a completely normal cognitive assessment - see flowsheet entries       Functional Ability/Status:   Minerva Wilkes has a completely normal functional assessment. See flowsheet for details.      Depression Screening (PHQ):  PHQ-2 SCORE: 0  , done 10/21/2024   Last [place] Suicide Screening on 10/25/2024 was No Risk.       Advanced Directives:   She does have a Living Will but we do NOT have it on file in Epic.    She has a Power of  for Health Care on file in [employer].  Patient has Advance Care Planning documents but we do not have a copy in EMR. Discussed Advanced Care Planning with patient and instructed patient to get our office a copy to be scanned into EMR.      Patient Active Problem List   Diagnosis    Varicose veins of lower extremity    Chronic rhinosinusitis    Fibrocystic breast disease    Polyp of colon    Adenomatous polyp    History of splenectomy    Right kidney stone    Trigger finger of right thumb    Acquired absence of both cervix and uterus    Acquired absence of other specified parts of digestive tract    Acquired absence of spleen    Non-toxic multinodular goiter    Nontoxic single thyroid nodule    Trigger finger of left thumb    History of malignant neoplasm of appendix     Allergies:  She is allergic to corn syrup  [glucose], dairy products, other, corn, cornstarch, eggs or egg-derived products, erythromycin, gluten flour, gluten meal, and nuts.    Current Medications:  Outpatient Medications Marked as Taking for the 10/25/24 encounter (Office Visit) with Mirtha Luis MD   Medication Sig    CALCIUM OR Take by mouth daily.      Omega-3 Fatty Acids (FISH OIL OR) Take 1,280 mg by mouth daily.      Multiple Vitamin (MULTI-VITAMIN DAILY OR) Take by mouth daily.      S-Adenosylmethionine (DANYEL-E) 400 MG Oral Tab Take 1 tablet by mouth daily.    LUTEIN OR Take 1 tablet by mouth daily.       Medical History:  She  has a past medical history of Abdominal distention, Abdominal hernia (08/2019), Abdominal pain, Amenorrhea, Appendiceal carcinoid tumor (HCC), Back pain (15 years), Belching, Bloating, Calculus of kidney (02/2019), Cancer ([facility]) (2018), Chronic rhinitis, Chronic rhinosinusitis, [place] bullosa (11/2010), Derangement of left knee (08/2002), Diarrhea, unspecified, Dysuria (08/11/2011), Fatigue (06/2019), Fibrocystic breast disease, Flatulence/gas pain/belching, Food intolerance, Heartburn (3 years), Hemorrhoids, Hydronephrosis, Hypertrophy of both inferior nasal turbinates (11/2011), Hypoglycemia, Migraines, Miscarriage ([facility]) (1984), Multinodular goiter, Nasal septal deviation (11/2010), Pharyngitis (08/2006), Sinus mucosal thickening (11/2010), Stress incontinence, Thyroid nodule, URI (upper respiratory infection) (05/2004), Varicose veins, and Vomiting.  Surgical History:  She  has a past surgical history that includes femur/knee surg unlisted; d & c (1984); fna (indicate source below); cyst aspiration left (Left); cyst aspiration right (Right); colonoscopy (06/02/2016); appendectomy; HIPEC SPECIMEN TO PATHOLOGY; other surgical history (12/18/2018); appendectomy (11/2018); cholecystectomy (12/2018); sigmoidoscopy,diagnostic (08/2019); total abdom hysterectomy (12/2019); hysterectomy; oophorectomy; Rotator cuff repair (Left,  08/29/2024); and anesth,knee arthroscopy (Right, 08/09/2024).   Family History:  Her family history includes Dementia in her mother; Diabetes in her mother; Heart Disorder in her father; Hypertension in her mother; Other in her mother; Prostate Cancer in her father; RA in her father; SLE,RA,DM in her sister.  Social History:  She  reports that she has never smoked. She has never used smokeless tobacco. She reports that she does not drink alcohol and does not use drugs.    Tobacco:  She has never smoked tobacco.    CAGE Alcohol Screen:   CAGE screening score of 0 on 10/21/2024, showing low risk of alcohol abuse.      Patient Care Team:  Mirtha Luis MD as PCP - General (Internal Medicine)  Kavon Puentes MD (GASTROENTEROLOGY)    Review of Systems   Constitutional:  Negative for fever.   HENT:  Negative for congestion.    Eyes:  Negative for visual disturbance.   Respiratory:  Negative for shortness of breath.    Cardiovascular:  Negative for chest pain.   Gastrointestinal:  Negative for constipation.   Genitourinary:  Negative for dysuria.   Neurological: Negative.    Hematological: Negative.    Psychiatric/Behavioral: Negative.           Objective:   Physical Exam  Vitals reviewed.   Constitutional:       General: She is not in acute distress.     Appearance: She is well-developed.   HENT:      Head: Normocephalic and atraumatic.      Right Ear: Tympanic membrane normal.      Left Ear: Tympanic membrane normal.   Eyes:      Conjunctiva/sclera: Conjunctivae normal.   Cardiovascular:      Rate and Rhythm: Normal rate and regular rhythm.      Heart sounds: Normal heart sounds.   Pulmonary:      Effort: Pulmonary effort is normal.      Breath sounds: Normal breath sounds.   Musculoskeletal:      Cervical back: Neck supple.   Skin:     General: Skin is warm and dry.   Neurological:      General: No focal deficit present.      Mental Status: She is alert.   Psychiatric:         Mood and Affect: Mood normal.            /86   Pulse 76   Temp 97.6 °F (36.4 °C) (Temporal)   Resp 16   Ht 5' 5\" (1.651 m)   Wt 178 lb 3.2 oz (80.8 kg)   SpO2 96%   BMI 29.65 kg/m²  Estimated body mass index is 29.65 kg/m² as calculated from the following:    Height as of this encounter: 5' 5\" (1.651 m).    Weight as of this encounter: 178 lb 3.2 oz (80.8 kg).    Medicare Hearing Assessment:   Hearing Screening    Screening Method: Finger Rub               Assessment & Plan:   Minerva Wilkes is a 68 year old female who presents for a Medicare Assessment.     1. Wellness examination (Primary)  -due for Tdap, prevnar 20, and meningitis vaccine  -she declines dexa   -declines additional colon ca screening due to yearly CT scans she receives. We discussed polyps may not be able to visualized on CT and she may want to consider colonoscopy   2. Pure hypercholesterolemia  -     Lipid Panel; Future; Expected date: 10/25/2024  -     Comp Metabolic Panel (14); Future; Expected date: 10/25/2024  3. Screening mammogram for breast cancer  -     [facility] DAVI 2D+3D SCREENING BILAT (CPT=77067/06548); Future; Expected date: 12/28/2024  4. Abnormal CBC - check labs  -     CBC With Differential With Platelet; Future; Expected date: 10/25/2024  5. Multiple thyroid nodules - check labs. Previous US was unchanged and no further imaging recommended by specialist  -     TSH W Reflex To Free T4; Future; Expected date: 10/25/2024  6. History of splenectomy - due for meningitis and pneumonia vaccines. Recommended every 5 years. Last received in 2019  7. History of malignant neoplasm of appendix - no evidence of recurrence on last imaging; follows yearly with [facility] specialist     The patient indicates understanding of these issues and agrees to the plan.  Reinforced healthy diet, lifestyle, and exercise.      Return in about 1 year (around 10/25/2025).     Mirtha Luis MD, 10/25/2024     Supplementary Documentation:   General Health:  In the past six  months, have you lost more than 10 pounds without trying?: (Patient-Rptd) 2 - No  Has your appetite been poor?: (Patient-Rptd) No  Type of Diet: (Patient-Rptd) Balanced;Low Salt;Other  How does the patient maintain a good energy level?: (Patient-Rptd) Appropriate Exercise  How would you describe your daily physical activity?: (Patient-Rptd) Light  How would you describe your current health state?: (Patient-Rptd) Good  How do you maintain positive mental well-being?: (Patient-Rptd) Games;Visiting Friends;Visiting Family  On a scale of 0 to 10, with 0 being no pain and 10 being severe pain, what is your pain level?: (Patient-Rptd) 2 - (Mild)  In the past six months, have you experienced urine leakage?: (Patient-Rptd) 0-No  At any time do you feel concerned for the safety/well-being of yourself and/or your children, in your home or elsewhere?: (Patient-Rptd) No  Have you had any immunizations at another office such as Influenza, Hepatitis B, Tetanus, or Pneumococcal?: (Patient-Rptd) Yes    Health Maintenance   Topic Date Due    DEXA Scan  Never done    Annual Physical  11/21/2023    Zoster Vaccines (2 of 2) 12/08/2023    Annual Depression Screening  01/01/2024    COVID-19 Vaccine (4 - 2023-24 season) 09/01/2024    HTN: BP Follow-Up  09/06/2024    Influenza Vaccine (1) 10/01/2024    Pneumococcal Vaccine: 65+ Years (3 of 3 - PPSV23 or PCV20) 11/22/2024    Mammogram  12/28/2024    Colorectal Cancer Screening  08/09/2029    Fall Risk Screening (Annual)  Completed

## 2024-11-06 ENCOUNTER — TELEPHONE (OUTPATIENT)
Dept: ORTHOPEDICS CLINIC | Facility: CLINIC | Age: 69
End: 2024-11-06

## 2024-11-06 ENCOUNTER — OFFICE VISIT (OUTPATIENT)
Dept: ORTHOPEDICS CLINIC | Facility: CLINIC | Age: 69
End: 2024-11-06
Payer: MEDICARE

## 2024-11-06 DIAGNOSIS — M25.469 KNEE SWELLING: ICD-10-CM

## 2024-11-06 DIAGNOSIS — M25.561 ACUTE PAIN OF RIGHT KNEE: ICD-10-CM

## 2024-11-06 DIAGNOSIS — Z98.890 S/P ARTHROSCOPY OF KNEE: Primary | ICD-10-CM

## 2024-11-06 PROCEDURE — 99024 POSTOP FOLLOW-UP VISIT: CPT | Performed by: ORTHOPAEDIC SURGERY

## 2024-11-06 PROCEDURE — 20610 DRAIN/INJ JOINT/BURSA W/O US: CPT | Performed by: ORTHOPAEDIC SURGERY

## 2024-11-06 RX ORDER — TRIAMCINOLONE ACETONIDE 40 MG/ML
40 INJECTION, SUSPENSION INTRA-ARTICULAR; INTRAMUSCULAR ONCE
Status: COMPLETED | OUTPATIENT
Start: 2024-11-06 | End: 2024-11-06

## 2024-11-06 RX ORDER — KETOROLAC TROMETHAMINE 30 MG/ML
30 INJECTION, SOLUTION INTRAMUSCULAR; INTRAVENOUS ONCE
Status: COMPLETED | OUTPATIENT
Start: 2024-11-06 | End: 2024-11-06

## 2024-11-06 RX ADMIN — TRIAMCINOLONE ACETONIDE 40 MG: 40 INJECTION, SUSPENSION INTRA-ARTICULAR; INTRAMUSCULAR at 10:46:00

## 2024-11-06 RX ADMIN — KETOROLAC TROMETHAMINE 30 MG: 30 INJECTION, SOLUTION INTRAMUSCULAR; INTRAVENOUS at 10:46:00

## 2024-11-06 NOTE — TELEPHONE ENCOUNTER
Patient was given an additional Parking Placard extension for another 2 months. This will be valid from December 2024 to January 2025.     Dr Warren said, no more further extension may be given after this. Patient is aware and understood.

## 2024-11-06 NOTE — PROGRESS NOTES
[facility]  [address] [place] [address]  [phone]       Name: Minerva Wilkes   MRN: [id number]  Date: 11/6/2024     REASON FOR VISIT: Second Post-Surgical Visit   Surgery: Right lateral meniscectomy, debridement on August 08, 2024.     INTERVAL HISTORY:  Minerva Wilkes is a 68 year old female who returns after the aforementioned procedure.  The post-operative course has been unremarkable with pain well controlled and overall progress noted.     Physical therapy was started and is progressing well. She reports 3-6/10 pain which is likely related to arthritis.        PE:   There were no vitals filed for this visit.  Estimated body mass index is 29.65 kg/m² as calculated from the following:    Height as of 10/25/24: 5' 5\" (1.651 m).    Weight as of 10/25/24: 178 lb 3.2 oz.    Physical Exam  Constitutional:       Appearance: Normal appearance.   HENT:      Head: Normocephalic and atraumatic.   Eyes:      Extraocular Movements: Extraocular movements intact.   Neck:      Musculoskeletal: Normal range of motion and neck supple.   Cardiovascular:      Pulses: Normal pulses.   Pulmonary:      Effort: Pulmonary effort is normal. No respiratory distress.   Abdominal:      General: There is no distension.   Skin:     General: Skin is warm.      Capillary Refill: Capillary refill takes less than 2 seconds.      Findings: No bruising.   Neurological:      General: No focal deficit present.      Mental Status: She is alert.   Psychiatric:         Mood and Affect: Mood normal.     Examination of the right knee demonstrates:     Physical examination the patient is alert and oriented x3, well-developed, well-nourished, no acute distress.     ROM 3-125 degrees. Mild pain with terminal extension and deep flexion.    Incisional sites are clean dry intact without signs of active pathology.  Calf is soft and nontender to palpation.    The contralateral knee is without  limitation in range of motion or strength, no positive provocative maneuvers.       IMPRESSION: Minerva Wilkes is a 68 year old female who presents 3 months s/p right lateral meniscectomy, debridement on August 08, 2024.     Patient also presents with right knee pain and swelling which will likely benefit from an intra-articular corticosteroid and ketorolac injection.    PLAN:   We had a lengthy discussion with the patient regarding the patient's findings consistent with the expected postoperative course. We recommend continuation of physical therapy with rehabilitation efforts focused on strengthening, range of motion, functional ability, and return to baseline activity. The patient can continue to progress per protocol.    We reviewed the treatment of this disease condition.  Fortunately, treatment is amenable to conservative treatment which we chose to optimize at today's visit.  After a discussion of a variety of conservative treatment options, I recommended intra-articular injection with corticosteroid and ketorolac.       We elected to proceed with the injection procedure at today's visit. We discussed the risk and benefits of the procedure; including, but not limited to: infection, injury to blood vessels, nerve injury, prolonged pain, swelling, site soreness, failure to progress, and need for advanced treatments.  The patient voiced understanding and agreed to proceed with the treatment plan.      We additionally discussed the role of viscosupplementation and PRP injection.    All questions were answered appropriately and the patient was in agreement with the treatment plan.       FOLLOW-UP:  Return to clinic in eight weeks. No imaging required at next visit.         Allie Warren MD  Knee, Shoulder, & Elbow Surgery / Sports Medicine Specialist  Orthopaedic Surgery  [address] [place] [address]   [email or web address]  [email or web address]  t: [phone]  o: [phone]  f:  [phone]     This note was dictated using Dragon software.  While it was briefly proofread prior to completion, some grammatical, spelling, and word choice errors due to dictation may still occur.

## 2024-11-06 NOTE — PROCEDURES
Right Knee Intra-articular Injection    Name: Minerva Wilkes   MRN: [id number]  Date: 11/6/2024     Clinical Indications:   Persistent knee pain refractory to conservative measures.     After informed consent, the injection site was marked, sterilized with topical chlorhexidine antiseptic, and locally anesthetized with skin refrigerant.    The patient was situation in a comfortable position. Using sterile technique: 1 mL of 30mg/mL of Ketorolac, 2 mL of 0.5% Bupivicaine, 2 mL of 1% Lidocaine, and 1 mL of 40 mg/ml Triamcinolone was injected utilizing anterolateral approach with a 22 gauge needle.  A band-aid was applied.  The patient tolerated the procedure well.        Allie Warren MD  Knee, Shoulder, & Elbow Surgery / Sports Medicine Specialist  Orthopaedic Surgery  [address] [place] [address].org  [email or web address]  t: [phone]  o: [phone]  f: [phone]

## 2024-11-18 ENCOUNTER — PATIENT MESSAGE (OUTPATIENT)
Dept: ORTHOPEDICS CLINIC | Facility: CLINIC | Age: 69
End: 2024-11-18

## 2024-11-18 DIAGNOSIS — M17.12 PRIMARY OSTEOARTHRITIS OF LEFT KNEE: ICD-10-CM

## 2024-11-18 DIAGNOSIS — M17.11 PRIMARY OSTEOARTHRITIS OF RIGHT KNEE: Primary | ICD-10-CM

## 2024-11-25 ENCOUNTER — TELEPHONE (OUTPATIENT)
Facility: CLINIC | Age: 69
End: 2024-11-25

## 2024-12-01 ENCOUNTER — PATIENT MESSAGE (OUTPATIENT)
Dept: INTERNAL MEDICINE CLINIC | Facility: CLINIC | Age: 69
End: 2024-12-01

## 2024-12-12 NOTE — TELEPHONE ENCOUNTER
Patient authorized visco to be scheduled:    DOS: 12/16/2024  PROVIDER: TIP  MEDICATION: SAMMY  OFFICE LOCATION: [facility]  PULLED: 12/13/2024  LABELED:12/13/2024  PLACED: 12/13/2024

## 2024-12-16 ENCOUNTER — OFFICE VISIT (OUTPATIENT)
Dept: ORTHOPEDICS CLINIC | Facility: CLINIC | Age: 69
End: 2024-12-16
Payer: MEDICARE

## 2024-12-16 DIAGNOSIS — M17.11 PRIMARY OSTEOARTHRITIS OF RIGHT KNEE: Primary | ICD-10-CM

## 2024-12-16 PROCEDURE — 20610 DRAIN/INJ JOINT/BURSA W/O US: CPT | Performed by: ORTHOPAEDIC SURGERY

## 2024-12-16 PROCEDURE — 99212 OFFICE O/P EST SF 10 MIN: CPT | Performed by: ORTHOPAEDIC SURGERY

## 2024-12-16 NOTE — PROCEDURES
Right Knee Intra-articular Injection    Name: Minerva Wilkes   MRN: [id number]  Date: 12/16/2024     Clinical Indications:   Knee Osteoarthritis with symptoms refractory to conservative measures.     After informed consent, the injection site was marked, sterilized with topical chlorhexidine antiseptic, and locally anesthetized with skin refrigerant.    The patient was situation in a comfortable position. Using sterile technique: a single Durolane 60mg/3mL (premixed syringe) was injected utilizing anterolateral approach with a 22 gauge needle.  A band-aid was applied.  The patient tolerated the procedure well.        Allie Warren MD  Knee, Shoulder, & Elbow Surgery / Sports Medicine Specialist  Orthopaedic Surgery  [address] [place] [address].org  [email or web address]  t: [phone]  o: [phone]  f: [phone]

## 2024-12-16 NOTE — PROGRESS NOTES
Orthopaedic Surgery  [address] [place] [address]  [phone]     Name: Minerva Wilkes   MRN: [id number]  Date: 12/16/2024     REASON FOR VISIT:   Surgery:   Right lateral meniscectomy, debridement on August 08, 2024.   Left rotator cuff repair, biceps tenodesis, subacromial decompression, and extensive debridement on 08/29/2024.     INTERVAL HISTORY:   Minerva Wilkes is a very pleasant 69 year old female who returns after the aforementioned procedures. The post-operative course has been unremarkable with pain well controlled and overall progress noted.      Physical therapy was started and is progressing well. Working with [employer] Denies any pain. She is traveling to [place] in the next 5 months.    PE:   There were no vitals filed for this visit.  Estimated body mass index is 29.65 kg/m² as calculated from the following:    Height as of 10/25/24: 5' 5\" (1.651 m).    Weight as of 10/25/24: 178 lb 3.2 oz.    Physical Exam  Constitutional:       Appearance: Normal appearance.   HENT:      Head: Normocephalic and atraumatic.   Eyes:      Extraocular Movements: Extraocular movements intact.   Neck:      Musculoskeletal: Normal range of motion and neck supple.   Cardiovascular:      Pulses: Normal pulses.   Pulmonary:      Effort: Pulmonary effort is normal. No respiratory distress.   Abdominal:      General: There is no distension.   Skin:     General: Skin is warm.      Capillary Refill: Capillary refill takes less than 2 seconds.      Findings: No bruising.   Neurological:      General: No focal deficit present.      Mental Status: She is alert.   Psychiatric:         Mood and Affect: Mood normal.     Examination of the left shoulder demonstrates:     Physical examination the patient is alert and oriented x3, well-developed, well-nourished, no acute distress.      Full and symmetric ROM. 5/5 strength with subtle weakness in the supraspinatus.      Incisional sites are clean  dry intact without signs of active pathology.       The contralateral shoulder is without limitation in range of motion or strength, no positive provocative maneuvers.     IMPRESSION: Minerva Wilkes is a 69 year old female who presents 4 months s/p Right lateral meniscectomy, debridement on August 08, 2024 and Left rotator cuff repair, biceps tenodesis, subacromial decompression, and extensive debridement on 08/29/2024.     Due to right knee osteoarthritis, patient will benefit with a Durolane injection.    PLAN:   We had a lengthy discussion with the patient regarding the patient's findings consistent with the expected postoperative course. We recommend continuation of physical therapy with rehabilitation efforts focused on strengthening, range of motion, functional ability, and return to baseline activity. The patient can continue to progress per protocol.    For the right knee, I recommended an intra-articular injection with Durolane.       We elected to proceed with the injection procedure at today's visit. We discussed the risk and benefits of the procedure; including, but not limited to: infection, injury to blood vessels, nerve injury, prolonged pain, swelling, site soreness, failure to progress, and need for advanced treatments.  The patient voiced understanding and agreed to proceed with the treatment plan.       All questions were answered appropriately and the patient was in agreement with the treatment plan.     FOLLOW-UP:   Return to clinic in 5 months. No imaging required at next visit.         Allie Warren MD  Knee, Shoulder, & Elbow Surgery / Sports Medicine Specialist  Orthopaedic Surgery  [address] [place] [address] [id number] [place] [address].org  [email or web address]  t: [phone]  o: [phone]  f: [phone]    This note was dictated using Dragon software.  While it was briefly proofread prior to completion, some grammatical, spelling, and word choice errors due to  dictation may still occur.

## 2025-01-06 ENCOUNTER — HOSPITAL ENCOUNTER (OUTPATIENT)
Age: 70
Discharge: HOME | End: 2025-01-06
Payer: MEDICARE

## 2025-01-06 ENCOUNTER — APPOINTMENT (OUTPATIENT)
Dept: GENERAL RADIOLOGY | Age: 70
End: 2025-01-06
Attending: NURSE PRACTITIONER
Payer: MEDICARE

## 2025-01-06 VITALS
SYSTOLIC BLOOD PRESSURE: 133 MMHG | DIASTOLIC BLOOD PRESSURE: 85 MMHG | TEMPERATURE: 98 F | OXYGEN SATURATION: 97 % | RESPIRATION RATE: 18 BRPM | HEART RATE: 89 BPM

## 2025-01-06 DIAGNOSIS — R06.89 ADVENTITIOUS BREATH SOUNDS: Primary | ICD-10-CM

## 2025-01-06 DIAGNOSIS — J06.9 VIRAL UPPER RESPIRATORY TRACT INFECTION: ICD-10-CM

## 2025-01-06 DIAGNOSIS — Z20.828 RSV EXPOSURE: ICD-10-CM

## 2025-01-06 PROCEDURE — 99204 OFFICE O/P NEW MOD 45 MIN: CPT | Performed by: NURSE PRACTITIONER

## 2025-01-06 PROCEDURE — 71046 X-RAY EXAM CHEST 2 VIEWS: CPT | Performed by: NURSE PRACTITIONER

## 2025-01-06 RX ORDER — METHYLPREDNISOLONE 4 MG/1
TABLET ORAL
Qty: 1 EACH | Refills: 0 | Status: SHIPPED | OUTPATIENT
Start: 2025-01-06

## 2025-01-06 RX ORDER — BENZONATATE 100 MG/1
100 CAPSULE ORAL 3 TIMES DAILY PRN
Qty: 30 CAPSULE | Refills: 0 | Status: SHIPPED | OUTPATIENT
Start: 2025-01-06 | End: 2025-02-05

## 2025-01-06 RX ORDER — ALBUTEROL SULFATE 90 UG/1
2 INHALANT RESPIRATORY (INHALATION) EVERY 4 HOURS PRN
Qty: 1 EACH | Refills: 0 | Status: SHIPPED | OUTPATIENT
Start: 2025-01-06 | End: 2025-02-05

## 2025-01-06 NOTE — ED PROVIDER NOTES
Patient Seen in: [facility]      History     Chief Complaint   Patient presents with    Cough     Stated Complaint: cough    Subjective:   HPI      69-year-old female with history of splenectomy presents today with complaints of cough.  Patient states that she was exposed to her grandchild with RSV.  Patient denies any shortness of breath associated with the cough.    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    Derangement of left knee    with pain,swelling,difficulty kneeling, difficulty going up and down stairs    Diarrhea, unspecified    Dysuria    Fatigue    Fibrocystic breast disease    Flatulence/gas pain/belching    Food intolerance    Heartburn    Hemorrhoids    Hydronephrosis    Hypertrophy of both inferior nasal turbinates    L>R    Hypoglycemia    Migraines    Miscarriage (HCC)    s/p D & C    Multinodular goiter    Nasal septal deviation    significant,right sided    Pharyngitis    Sinus mucosal thickening    bilat, of maxillary sinus floors and scant bilateral anterior ethmoidal air cell mucosal thickening    Stress incontinence    Thyroid nodule    s/p FNA, negative    URI (upper respiratory infection)    mild bronchitis    Varicose veins    symptomatic    Vomiting              Past Surgical History:   Procedure Laterality Date    Anesth,knee arthroscopy Right 08/09/2024    Appendectomy      Appendectomy  11/2018    Cholecystectomy  12/2018    Colonoscopy  06/02/2016    repeat 10 yrs- colon polyps    Cyst aspiration left Left     benign    Cyst aspiration right Right     benign    D & c  1984    Femur/knee surg unlisted      left knee arthroscopy    Fna (indicate source  below)      thyroid nodule x1 negative    Hipec specimen to pathology      Hysterectomy      Oophorectomy      Other surgical history  12/18/2018    Cystoscopy, Stent Placement @ [facility]    Other surgical history Right 08/08/2024    Right Knee Arthroscopy Partial Medial and Lateral Meniscectomy Synovectomy    Rotator cuff repair Left 08/29/2024    Sigmoidoscopy,diagnostic  08/2019    Total abdom hysterectomy  12/2019                No pertinent social history.            Review of Systems   Constitutional: Negative.    HENT: Negative.     Eyes: Negative.    Respiratory:  Positive for cough and wheezing.    Cardiovascular: Negative.    Gastrointestinal: Negative.    Endocrine: Negative.    Genitourinary: Negative.    Musculoskeletal: Negative.    Skin: Negative.    Allergic/Immunologic: Negative.    Neurological: Negative.    Hematological: Negative.    Psychiatric/Behavioral: Negative.         Positive for stated complaint: cough  Other systems are as noted in HPI.  Constitutional and vital signs reviewed.      All other systems reviewed and negative except as noted above.    Physical Exam     ED Triage Vitals [01/06/25 0814]   /85   Pulse 89   Resp 18   Temp 98.4 °F (36.9 °C)   Temp src Oral   SpO2 97 %   O2 Device None (Room air)       Current Vitals:   Vital Signs  BP: 133/85  Pulse: 89  Resp: 18  Temp: 98.4 °F (36.9 °C)  Temp src: Oral    Oxygen Therapy  SpO2: 97 %  O2 Device: None (Room air)        Physical Exam  Vitals and nursing note reviewed.   Constitutional:       Appearance: Normal appearance. She is normal weight.   HENT:      Head: Normocephalic.      Right Ear: External ear normal.      Left Ear: External ear normal.   Eyes:      Extraocular Movements: Extraocular movements intact.      Conjunctiva/sclera: Conjunctivae normal.      Pupils: Pupils are equal, round, and reactive to light.   Cardiovascular:      Rate and Rhythm: Normal rate and regular rhythm.      Pulses: Normal pulses.       Heart sounds: Normal heart sounds.   Pulmonary:      Effort: Pulmonary effort is normal.      Breath sounds: Wheezing and rhonchi present.      Comments: Adventitious lung sounds appreciated to the left lower lobe.  Musculoskeletal:      Cervical back: Normal range of motion and neck supple.   Skin:     General: Skin is warm.      Capillary Refill: Capillary refill takes less than 2 seconds.   Neurological:      General: No focal deficit present.      Mental Status: She is alert.   Psychiatric:         Mood and Affect: Mood normal.            ED Course   Labs Reviewed - No data to display                MDM      69-year-old female with history of splenectomy presents today with complaints of cough.  Patient states that she was exposed to her grandchild with RSV.  Patient denies any shortness of breath associated with the cough.  Vital signs: Please see EMR.  Physical exam: Please see exam.  Differential diagnosis: Pneumonia, bronchitis, URI.  Based on physical exam and HPI will diagnosed with upper respiratory tract infection and RSV exposure.  Prescribed Medrol Dosepak, Tessalon and ProAir.  Patient instructed to increase her water intake and replace her toothbrush.        Medical Decision Making  69-year-old female with history of splenectomy presents today with complaints of cough.  Patient states that she was exposed to her grandchild with RSV.  Patient denies any shortness of breath associated with the cough.    Problems Addressed:  Adventitious breath sounds: acute illness or injury  RSV exposure: acute illness or injury  Viral upper respiratory tract infection: acute illness or injury    Amount and/or Complexity of Data Reviewed  Radiology: ordered. Decision-making details documented in ED Course.    Risk  Prescription drug management.        Disposition and Plan     Clinical Impression:  1. Adventitious breath sounds    2. Viral upper respiratory tract infection    3. RSV exposure          Disposition:  Discharge  1/6/2025  8:52 am    Follow-up:  Mirtha Luis MD  [address] [place] [id number] [phone]    In 1 week            Medications Prescribed:  Current Discharge Medication List        START taking these medications    Details   methylPREDNISolone (MEDROL) 4 MG Oral Tablet Therapy Pack Dosepack: take as directed  Qty: 1 each, Refills: 0      benzonatate 100 MG Oral Cap Take 1 capsule (100 mg total) by mouth 3 (three) times daily as needed for cough.  Qty: 30 capsule, Refills: 0      albuterol 108 (90 Base) MCG/ACT Inhalation Aero Soln Inhale 2 puffs into the lungs every 4 (four) hours as needed for Wheezing.  Qty: 1 each, Refills: 0                 Supplementary Documentation:

## 2025-01-06 NOTE — DISCHARGE INSTRUCTIONS
Increase water intake 2-3 liters daily   Please follow-up with your primary care provider within 2 to 3 days.  If you develop any difficulty breathing please go to the ER.

## 2025-01-06 NOTE — ED INITIAL ASSESSMENT (HPI)
Patient here with c/o worsening cough; states it's day 12 of symptoms. Reports fever about 5 days ago.

## 2025-01-09 ENCOUNTER — HOSPITAL ENCOUNTER (OUTPATIENT)
Age: 70
Discharge: HOME | End: 2025-01-09
Payer: MEDICARE

## 2025-01-09 VITALS
TEMPERATURE: 98 F | DIASTOLIC BLOOD PRESSURE: 86 MMHG | RESPIRATION RATE: 18 BRPM | SYSTOLIC BLOOD PRESSURE: 147 MMHG | OXYGEN SATURATION: 99 % | HEART RATE: 67 BPM

## 2025-01-09 DIAGNOSIS — H92.01 RIGHT EAR PAIN: Primary | ICD-10-CM

## 2025-01-09 NOTE — ED INITIAL ASSESSMENT (HPI)
Patient reports right ear pain and intermittent vertigo starting two nights ago, pt recently seen here for cough/cold symptoms a few days ago and had a chest xray done that was negative, denies fever/chills

## 2025-01-09 NOTE — ED PROVIDER NOTES
Patient Seen in: [facility]      History     Chief Complaint   Patient presents with    Ear Pain     Stated Complaint: EAR PAIN    Subjective:   69-year-old female presents today with new onset of right ear pain.  States has had dizziness with this but none at this time.  Recently was seen and treated for viral URI with bronchitis.  Currently on Medrol Dosepak and use albuterol inhaler as needed.  Denies any drainage from the ear.  No nausea vomiting.  Alert oriented x 3.  No other symptoms or concerns.  The patient's medication list, past medical history and social history elements as listed in today's nurse's notes were reviewed and agreed (except as otherwise stated in the HPI).  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    Derangement of left knee    with pain,swelling,difficulty kneeling, difficulty going up and down stairs    Diarrhea, unspecified    Dysuria    Fatigue    Fibrocystic breast disease    Flatulence/gas pain/belching    Food intolerance    Heartburn    Hemorrhoids    Hydronephrosis    Hypertrophy of both inferior nasal turbinates    L>R    Hypoglycemia    Migraines    Miscarriage (HCC)    s/p D & C    Multinodular goiter    Nasal septal deviation    significant,right sided    Pharyngitis    Sinus mucosal thickening    bilat, of maxillary sinus floors and scant bilateral anterior ethmoidal air cell mucosal thickening    Stress incontinence    Thyroid nodule    s/p FNA, negative    URI (upper respiratory infection)    mild bronchitis    Varicose veins     symptomatic    Vomiting              Past Surgical History:   Procedure Laterality Date    Anesth,knee arthroscopy Right 08/09/2024    Appendectomy      Appendectomy  11/2018    Cholecystectomy  12/2018    Colonoscopy  06/02/2016    repeat 10 yrs- colon polyps    Cyst aspiration left Left     benign    Cyst aspiration right Right     benign    D & c  1984    Femur/knee surg unlisted      left knee arthroscopy    Fna (indicate source below)      thyroid nodule x1 negative    Hipec specimen to pathology      Hysterectomy      Oophorectomy      Other surgical history  12/18/2018    Cystoscopy, Stent Placement @ [facility]    Other surgical history Right 08/08/2024    Right Knee Arthroscopy Partial Medial and Lateral Meniscectomy Synovectomy    Rotator cuff repair Left 08/29/2024    Sigmoidoscopy,diagnostic  08/2019    Total abdom hysterectomy  12/2019                No pertinent social history.            Review of Systems    Positive for stated complaint: EAR PAIN  Other systems are as noted in HPI.  Constitutional and vital signs reviewed.      All other systems reviewed and negative except as noted above.    Physical Exam     ED Triage Vitals [01/09/25 0931]   /86   Pulse 67   Resp 18   Temp 98.4 °F (36.9 °C)   Temp src Oral   SpO2 99 %   O2 Device None (Room air)       Current Vitals:   Vital Signs  BP: 147/86  Pulse: 67  Resp: 18  Temp: 98.4 °F (36.9 °C)  Temp src: Oral    Oxygen Therapy  SpO2: 99 %  O2 Device: None (Room air)        Physical Exam  Vitals and nursing note reviewed.   Constitutional:       Appearance: She is well-developed.   HENT:      Head: Normocephalic.      Right Ear: Tympanic membrane and ear canal normal.      Left Ear: Tympanic membrane and ear canal normal.      Nose: Mucosal edema and congestion present.      Mouth/Throat:      Mouth: Mucous membranes are moist.      Pharynx: Uvula midline.   Eyes:      Conjunctiva/sclera: Conjunctivae normal.      Pupils: Pupils are equal, round,  and reactive to light.   Cardiovascular:      Rate and Rhythm: Normal rate and regular rhythm.   Pulmonary:      Effort: Pulmonary effort is normal.      Breath sounds: Normal breath sounds.   Musculoskeletal:      Cervical back: Normal range of motion and neck supple.   Lymphadenopathy:      Cervical: No cervical adenopathy.   Skin:     General: Skin is warm and dry.   Neurological:      Mental Status: She is alert and oriented to person, place, and time.             ED Course   Labs Reviewed - No data to display                MDM     Please note that this report has been produced using speech recognition software and may contain errors related to that system including, but not limited to, errors in grammar, punctuation, and spelling, as well as words and phrases that possibly may have been recognized inappropriately.  If there are any questions or concerns, contact the dictating provider for clarification.              Medical Decision Making  Differential diagnosis includes but is not limited to: Otitis media, otitis externa, strep throat, eustachian tube dysfunction, mastoiditis, TMJ      Presents today with complaints of right ear pain.  Currently being treated for viral URI and bronchitis.  Lungs are clear on exam.  Bilateral TM appear to be normal.  No gross amount of fluid behind TM.  Do suspect symptoms caused by a viral URI.  Encouraged to alternate Tylenol Motrin for any pain or fever.  Did offer meclizine for intermittent dizziness but patient declines at this time.  Supportive care was discussed.  To follow-up with primary care physician in 1 week if symptoms do not improve.  Patient verbalized understanding and agreed to plan of care.    Risk  OTC drugs.        Disposition and Plan     Clinical Impression:  1. Right ear pain         Disposition:  Discharge  1/9/2025  9:52 am    Follow-up:  Mirtha Luis MD  [address] [place] [id number] [phone]    In 1 week  As  needed          Medications Prescribed:  Current Discharge Medication List              Supplementary Documentation:

## 2025-01-09 NOTE — ED NOTES
Pt called back to find out if she is contagious. I recommended hand washing and covering coughs and sneezes. I do not believe pt is actively contagious.

## 2025-01-10 ENCOUNTER — HOSPITAL ENCOUNTER (OUTPATIENT)
Dept: MAMMOGRAPHY | Age: 70
Discharge: HOME | End: 2025-01-10
Attending: INTERNAL MEDICINE
Payer: MEDICARE

## 2025-01-10 ENCOUNTER — MED REC SCAN ONLY (OUTPATIENT)
Facility: CLINIC | Age: 70
End: 2025-01-10

## 2025-01-10 DIAGNOSIS — Z12.31 SCREENING MAMMOGRAM FOR BREAST CANCER: ICD-10-CM

## 2025-01-10 PROCEDURE — 77063 BREAST TOMOSYNTHESIS BI: CPT | Performed by: INTERNAL MEDICINE

## 2025-01-10 PROCEDURE — 77067 SCR MAMMO BI INCL CAD: CPT | Performed by: INTERNAL MEDICINE

## 2025-03-20 NOTE — TELEPHONE ENCOUNTER
Received fax from Dr. Florencio Wallis from [facility] stating the CT from March was denied. LS requesting pt be called and notified if not already notified and advising for her to contact Dr. Edil Ortega to do appeal if possible. Called pt and notified her.  She stated sh Refill requested by: Patient  Last office visit for controlled substance: 11/6/2024  Next office visit: none scheduled   Medication(s) Requested: lisdexamfetamine (Vyvanse)   Dosage: 30 MG capsule   Sig:  Take 1 capsule by mouth every morning.   Date medication contract signed: 11/6/2024  Date of last urine drug screen: 11/6/2024  Result:  compliant  Last PDMP refill:      Lisdexamfetamine Dimesylate  [id number]  30MG / Capsule  Rx# [id number] Stimulant Qty: 30  Days: 30  Refills: 0 Prescribed: 1/31/2025  Dispensed: 1/31/2025  Sold: LINDA Rivera  [address] [facility] [address]     PDMP review: Criteria met. Forwarded to Physician/HAREJET for signature.  Can not refill without MD authorization

## 2025-08-04 ENCOUNTER — HOSPITAL ENCOUNTER (OUTPATIENT)
Dept: GENERAL RADIOLOGY | Age: 70
Discharge: HOME | End: 2025-08-04
Attending: ORTHOPAEDIC SURGERY

## 2025-08-04 ENCOUNTER — TELEPHONE (OUTPATIENT)
Dept: ORTHOPEDICS CLINIC | Facility: CLINIC | Age: 70
End: 2025-08-04

## 2025-08-04 ENCOUNTER — OFFICE VISIT (OUTPATIENT)
Dept: ORTHOPEDICS CLINIC | Facility: CLINIC | Age: 70
End: 2025-08-04

## 2025-08-04 DIAGNOSIS — M17.12 PRIMARY OSTEOARTHRITIS OF LEFT KNEE: ICD-10-CM

## 2025-08-04 DIAGNOSIS — M17.11 PRIMARY OSTEOARTHRITIS OF RIGHT KNEE: Primary | ICD-10-CM

## 2025-08-04 DIAGNOSIS — Z98.890 S/P ARTHROSCOPY OF KNEE: ICD-10-CM

## 2025-08-04 DIAGNOSIS — M17.11 PRIMARY OSTEOARTHRITIS OF RIGHT KNEE: ICD-10-CM

## 2025-08-04 PROCEDURE — 73564 X-RAY EXAM KNEE 4 OR MORE: CPT | Performed by: ORTHOPAEDIC SURGERY

## 2025-08-04 PROCEDURE — 99214 OFFICE O/P EST MOD 30 MIN: CPT | Performed by: ORTHOPAEDIC SURGERY

## 2025-08-13 ENCOUNTER — OFFICE VISIT (OUTPATIENT)
Dept: ORTHOPEDICS CLINIC | Facility: CLINIC | Age: 70
End: 2025-08-13

## 2025-08-13 DIAGNOSIS — M17.11 PRIMARY OSTEOARTHRITIS OF RIGHT KNEE: Primary | ICD-10-CM

## 2025-08-13 DIAGNOSIS — M17.12 PRIMARY OSTEOARTHRITIS OF LEFT KNEE: ICD-10-CM

## 2025-08-13 PROCEDURE — 0232T NJX PLATELET PLASMA: CPT | Performed by: ORTHOPAEDIC SURGERY

## (undated) NOTE — LETTER
24      Orthopedic Surgery   Pre-Operative Clearance Request    Patient Name:   Minerva Wilkes             :   1955    Surgeon: Dr. Warren             Date of Surgery: 2024    Surgical Procedure: Right Knee Arthroscopy Partial Medial Meniscectomy Synovectomy       MUST COMPLETE ALL OF THE FOLLOWING 2-3 WEEKS PRIOR TO YOUR SURGERY TO AVOID CANCELLATION, DUE TO THE RULE THEIR WILL BE NO EXCEPTIONS!      [x]  History and Physical      [x]  Medical  Clearance                                             **Please fax test results, H&P, and clearance to [phone] and to P.A[facility] at [phone]**

## (undated) NOTE — LETTER
12/23/19        [place] [address] [place] [id number]      Dear Nohemi Boss,    Our records indicate that you have outstanding lab work and or testing that was ordered for you and has not yet been completed:  Orders Placed This Encounter      MA